# Patient Record
Sex: MALE | Race: WHITE | NOT HISPANIC OR LATINO | Employment: UNEMPLOYED | RURAL
[De-identification: names, ages, dates, MRNs, and addresses within clinical notes are randomized per-mention and may not be internally consistent; named-entity substitution may affect disease eponyms.]

---

## 2020-05-05 ENCOUNTER — HISTORICAL (OUTPATIENT)
Dept: ADMINISTRATIVE | Facility: HOSPITAL | Age: 60
End: 2020-05-05

## 2021-03-23 ENCOUNTER — HOSPITAL ENCOUNTER (EMERGENCY)
Facility: HOSPITAL | Age: 61
Discharge: HOME | End: 2021-03-23
Payer: MEDICAID

## 2021-03-23 VITALS
HEIGHT: 67 IN | HEART RATE: 77 BPM | RESPIRATION RATE: 17 BRPM | WEIGHT: 189.5 LBS | BODY MASS INDEX: 29.74 KG/M2 | SYSTOLIC BLOOD PRESSURE: 155 MMHG | TEMPERATURE: 98 F | DIASTOLIC BLOOD PRESSURE: 85 MMHG | OXYGEN SATURATION: 97 %

## 2021-03-23 DIAGNOSIS — R06.2 WHEEZING: ICD-10-CM

## 2021-03-23 DIAGNOSIS — J40 BRONCHITIS: Primary | ICD-10-CM

## 2021-03-23 PROCEDURE — 99283 EMERGENCY DEPT VISIT LOW MDM: CPT | Mod: 25

## 2021-03-23 PROCEDURE — 25000242 PHARM REV CODE 250 ALT 637 W/ HCPCS: Performed by: NURSE PRACTITIONER

## 2021-03-23 PROCEDURE — 99283 EMERGENCY DEPT VISIT LOW MDM: CPT | Mod: ,,, | Performed by: NURSE PRACTITIONER

## 2021-03-23 PROCEDURE — 96372 THER/PROPH/DIAG INJ SC/IM: CPT

## 2021-03-23 PROCEDURE — 63600175 PHARM REV CODE 636 W HCPCS: Performed by: NURSE PRACTITIONER

## 2021-03-23 PROCEDURE — 99283 PR EMERGENCY DEPT VISIT,LEVEL III: ICD-10-PCS | Mod: ,,, | Performed by: NURSE PRACTITIONER

## 2021-03-23 RX ORDER — IPRATROPIUM BROMIDE AND ALBUTEROL SULFATE 2.5; .5 MG/3ML; MG/3ML
3 SOLUTION RESPIRATORY (INHALATION) ONCE
Status: COMPLETED | OUTPATIENT
Start: 2021-03-23 | End: 2021-03-23

## 2021-03-23 RX ORDER — BENZONATATE 100 MG/1
100 CAPSULE ORAL 3 TIMES DAILY PRN
Qty: 15 CAPSULE | Refills: 0 | Status: SHIPPED | OUTPATIENT
Start: 2021-03-23 | End: 2021-04-02

## 2021-03-23 RX ORDER — METHYLPREDNISOLONE 4 MG/1
TABLET ORAL
Qty: 1 PACKAGE | Refills: 0 | Status: SHIPPED | OUTPATIENT
Start: 2021-03-23 | End: 2021-04-13

## 2021-03-23 RX ORDER — ALBUTEROL SULFATE 90 UG/1
1-2 AEROSOL, METERED RESPIRATORY (INHALATION) EVERY 6 HOURS PRN
Qty: 8 G | Refills: 0 | Status: SHIPPED | OUTPATIENT
Start: 2021-03-23 | End: 2022-11-15

## 2021-03-23 RX ORDER — DEXAMETHASONE SODIUM PHOSPHATE 4 MG/ML
4 INJECTION, SOLUTION INTRA-ARTICULAR; INTRALESIONAL; INTRAMUSCULAR; INTRAVENOUS; SOFT TISSUE
Status: COMPLETED | OUTPATIENT
Start: 2021-03-23 | End: 2021-03-23

## 2021-03-23 RX ADMIN — DEXAMETHASONE SODIUM PHOSPHATE 4 MG: 4 INJECTION, SOLUTION INTRA-ARTICULAR; INTRALESIONAL; INTRAMUSCULAR; INTRAVENOUS; SOFT TISSUE at 05:03

## 2021-03-23 RX ADMIN — IPRATROPIUM BROMIDE AND ALBUTEROL SULFATE 3 ML: .5; 3 SOLUTION RESPIRATORY (INHALATION) at 05:03

## 2021-11-09 ENCOUNTER — HOSPITAL ENCOUNTER (EMERGENCY)
Facility: HOSPITAL | Age: 61
Discharge: HOME | End: 2021-11-09
Payer: MEDICAID

## 2021-11-09 VITALS
OXYGEN SATURATION: 96 % | SYSTOLIC BLOOD PRESSURE: 131 MMHG | RESPIRATION RATE: 18 BRPM | WEIGHT: 190 LBS | BODY MASS INDEX: 29.82 KG/M2 | HEART RATE: 86 BPM | DIASTOLIC BLOOD PRESSURE: 75 MMHG | TEMPERATURE: 98 F | HEIGHT: 67 IN

## 2021-11-09 DIAGNOSIS — J01.10 ACUTE NON-RECURRENT FRONTAL SINUSITIS: ICD-10-CM

## 2021-11-09 DIAGNOSIS — M54.41 ACUTE RIGHT-SIDED LOW BACK PAIN WITH RIGHT-SIDED SCIATICA: Primary | ICD-10-CM

## 2021-11-09 PROCEDURE — 96372 THER/PROPH/DIAG INJ SC/IM: CPT

## 2021-11-09 PROCEDURE — 99283 PR EMERGENCY DEPT VISIT,LEVEL III: ICD-10-PCS | Mod: ,,, | Performed by: NURSE PRACTITIONER

## 2021-11-09 PROCEDURE — 63600175 PHARM REV CODE 636 W HCPCS: Performed by: NURSE PRACTITIONER

## 2021-11-09 PROCEDURE — 99284 EMERGENCY DEPT VISIT MOD MDM: CPT

## 2021-11-09 PROCEDURE — 99283 EMERGENCY DEPT VISIT LOW MDM: CPT | Mod: ,,, | Performed by: NURSE PRACTITIONER

## 2021-11-09 RX ORDER — NAPROXEN SODIUM 220 MG/1
81 TABLET, FILM COATED ORAL DAILY
COMMUNITY
End: 2023-05-22 | Stop reason: SDUPTHER

## 2021-11-09 RX ORDER — FLUTICASONE PROPIONATE 50 UG/1
POWDER, METERED RESPIRATORY (INHALATION)
Status: ON HOLD | COMMUNITY
End: 2023-01-15 | Stop reason: HOSPADM

## 2021-11-09 RX ORDER — LANOLIN ALCOHOL/MO/W.PET/CERES
100 CREAM (GRAM) TOPICAL DAILY
Status: ON HOLD | COMMUNITY
End: 2023-01-15 | Stop reason: HOSPADM

## 2021-11-09 RX ORDER — LORATADINE 10 MG/1
10 TABLET ORAL DAILY
Status: ON HOLD | COMMUNITY
End: 2023-01-15 | Stop reason: SDUPTHER

## 2021-11-09 RX ORDER — AMOXICILLIN AND CLAVULANATE POTASSIUM 875; 125 MG/1; MG/1
1 TABLET, FILM COATED ORAL 2 TIMES DAILY
Qty: 14 TABLET | Refills: 0 | Status: SHIPPED | OUTPATIENT
Start: 2021-11-09 | End: 2022-01-12 | Stop reason: CLARIF

## 2021-11-09 RX ORDER — BUDESONIDE AND FORMOTEROL FUMARATE DIHYDRATE 160; 4.5 UG/1; UG/1
2 AEROSOL RESPIRATORY (INHALATION) EVERY 12 HOURS
Status: ON HOLD | COMMUNITY
End: 2023-01-15 | Stop reason: HOSPADM

## 2021-11-09 RX ORDER — KETOROLAC TROMETHAMINE 30 MG/ML
60 INJECTION, SOLUTION INTRAMUSCULAR; INTRAVENOUS
Status: COMPLETED | OUTPATIENT
Start: 2021-11-09 | End: 2021-11-09

## 2021-11-09 RX ORDER — AMLODIPINE BESYLATE 10 MG/1
10 TABLET ORAL DAILY
COMMUNITY

## 2021-11-09 RX ORDER — METHYLPREDNISOLONE 4 MG/1
TABLET ORAL
Qty: 1 EACH | Refills: 0 | Status: SHIPPED | OUTPATIENT
Start: 2021-11-09 | End: 2021-11-30

## 2021-11-09 RX ORDER — GABAPENTIN 300 MG/1
600 CAPSULE ORAL 3 TIMES DAILY
COMMUNITY
End: 2023-08-29

## 2021-11-09 RX ORDER — ATORVASTATIN CALCIUM 80 MG/1
80 TABLET, FILM COATED ORAL DAILY
Status: ON HOLD | COMMUNITY
End: 2023-01-15 | Stop reason: SDUPTHER

## 2021-11-09 RX ORDER — MELOXICAM 15 MG/1
7.5 TABLET ORAL DAILY
Status: ON HOLD | COMMUNITY
End: 2023-01-15 | Stop reason: SDUPTHER

## 2021-11-09 RX ORDER — METHOCARBAMOL 500 MG/1
500 TABLET, FILM COATED ORAL 2 TIMES DAILY
Status: ON HOLD | COMMUNITY
End: 2023-01-15 | Stop reason: SDUPTHER

## 2021-11-09 RX ORDER — HYDROXYZINE HYDROCHLORIDE 25 MG/1
25 TABLET, FILM COATED ORAL 3 TIMES DAILY
Status: ON HOLD | COMMUNITY
End: 2023-01-15 | Stop reason: SDUPTHER

## 2021-11-09 RX ADMIN — KETOROLAC TROMETHAMINE 60 MG: 30 INJECTION, SOLUTION INTRAMUSCULAR at 08:11

## 2021-11-10 ENCOUNTER — TELEPHONE (OUTPATIENT)
Dept: EMERGENCY MEDICINE | Facility: HOSPITAL | Age: 61
End: 2021-11-10
Payer: MEDICAID

## 2022-01-12 ENCOUNTER — HOSPITAL ENCOUNTER (EMERGENCY)
Facility: HOSPITAL | Age: 62
Discharge: HOME | End: 2022-01-12
Payer: MEDICAID

## 2022-01-12 VITALS
HEIGHT: 67 IN | OXYGEN SATURATION: 95 % | RESPIRATION RATE: 18 BRPM | SYSTOLIC BLOOD PRESSURE: 120 MMHG | HEART RATE: 83 BPM | BODY MASS INDEX: 29.66 KG/M2 | TEMPERATURE: 98 F | WEIGHT: 189 LBS | DIASTOLIC BLOOD PRESSURE: 69 MMHG
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
## 2022-01-12 DIAGNOSIS — S20.212A RIB CONTUSION, LEFT, INITIAL ENCOUNTER: ICD-10-CM
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
## 2022-01-12 PROCEDURE — 99283 EMERGENCY DEPT VISIT LOW MDM: CPT | Mod: ,,, | Performed by: NURSE PRACTITIONER

## 2022-01-12 PROCEDURE — 96372 THER/PROPH/DIAG INJ SC/IM: CPT

## 2022-01-12 PROCEDURE — 63600175 PHARM REV CODE 636 W HCPCS: Performed by: NURSE PRACTITIONER

## 2022-01-12 PROCEDURE — 99284 EMERGENCY DEPT VISIT MOD MDM: CPT

## 2022-01-12 PROCEDURE — 99283 PR EMERGENCY DEPT VISIT,LEVEL III: ICD-10-PCS | Mod: ,,, | Performed by: NURSE PRACTITIONER

## 2022-01-12 RX ORDER — KETOROLAC TROMETHAMINE 30 MG/ML
60 INJECTION, SOLUTION INTRAMUSCULAR; INTRAVENOUS
Status: COMPLETED | OUTPATIENT
Start: 2022-01-12 | End: 2022-01-12

## 2022-01-12 RX ADMIN — KETOROLAC TROMETHAMINE 60 MG: 30 INJECTION, SOLUTION INTRAMUSCULAR at 01:01

## 2022-01-12 NOTE — ED TRIAGE NOTES
Slipped and hit bumper of car.  C/O pain in left ribcage.  States pain is 8 on 0-10 pain scale.  Took 10mgs of hydrocodone for pain and it did not help.  No bruising seen, no deformity.

## 2022-01-12 NOTE — ED PROVIDER NOTES
Encounter Date: 1/12/2022       History     Chief Complaint   Patient presents with    Fall     Fall times two days ago, rates pain at 8 on 0-10 pain scale.  See's YANELY Armas for pain control.  Took hydrocodone 10 mgs with no relief.     60 y/o WM presents to the ED with complaints of left rib pain after having a fall 2 days ago and landing on a bumper.  He reports he takes Hydrocodone 10 mg for chronic pain. Sees pain management. States the pain pill did not give him much relief. Pt is concerned he may have a broken rib - had a broken rib last year from sneezing.         Review of patient's allergies indicates:   Allergen Reactions    Aloe vera latex gloves [gloves, latex with aloe vera] Rash    Latex Hives and Rash     Past Medical History:   Diagnosis Date    Arthritis     Cancer     CHF (congestive heart failure)     Chronic pain     Coronary artery disease     Diabetes mellitus     Hypertension      Past Surgical History:   Procedure Laterality Date    CARDIAC SURGERY      EYE SURGERY      STOMACH SURGERY       History reviewed. No pertinent family history.  Social History     Tobacco Use    Smoking status: Current Every Day Smoker     Packs/day: 1.00    Smokeless tobacco: Never Used   Substance Use Topics    Alcohol use: Never     Review of Systems   Constitutional: Negative.    HENT: Negative.    Eyes: Negative.    Respiratory: Negative.  Negative for chest tightness and shortness of breath.    Cardiovascular: Negative.  Negative for chest pain, palpitations and leg swelling.   Gastrointestinal: Negative.  Negative for diarrhea, nausea and vomiting.   Endocrine: Negative.    Genitourinary: Negative.    Musculoskeletal: Negative.  Negative for arthralgias, back pain and neck pain.        Left rib pain     Skin: Negative.    Neurological: Negative.    Psychiatric/Behavioral: Negative.    All other systems reviewed and are negative.      Physical Exam     Initial Vitals [01/12/22 0114]   BP Pulse  Resp Temp SpO2   (!) 150/74 89 18 97.7 °F (36.5 °C) 96 %      MAP       --         Physical Exam    Nursing note and vitals reviewed.  Constitutional: He appears well-developed and well-nourished.   HENT:   Head: Normocephalic and atraumatic.   Eyes: Conjunctivae and EOM are normal. Pupils are equal, round, and reactive to light.   Neck: Neck supple. No thyromegaly present. No tracheal deviation present. No JVD present.   Normal range of motion.  Cardiovascular: Normal rate, regular rhythm and normal heart sounds.   Pulmonary/Chest: Breath sounds normal. No stridor. No respiratory distress. He has no wheezes. He has no rhonchi. He has no rales. He exhibits tenderness (left rib cage).   Abdominal: Abdomen is soft. Bowel sounds are normal. There is no abdominal tenderness. There is no rebound and no guarding.   Musculoskeletal:         General: Normal range of motion.      Cervical back: Normal range of motion and neck supple.     Lymphadenopathy:     He has no cervical adenopathy.   Neurological: He is alert and oriented to person, place, and time. He has normal strength and normal reflexes.   Skin: Skin is warm and dry.   Psychiatric: He has a normal mood and affect.         Medical Screening Exam   See Full Note    ED Course   Procedures  Labs Reviewed - No data to display       Imaging Results          X-Ray Ribs 2 View Left (In process)                  Medications   ketorolac injection 60 mg (has no administration in time range)                       Clinical Impression:   Final diagnoses:  [W19.XXXA] Fran Dai, [facility]  01/12/22 0231

## 2022-01-14 ENCOUNTER — TELEPHONE (OUTPATIENT)
Dept: EMERGENCY MEDICINE | Facility: HOSPITAL | Age: 62
End: 2022-01-14
Payer: MEDICAID

## 2022-11-13 ENCOUNTER — HOSPITAL ENCOUNTER (EMERGENCY)
Facility: HOSPITAL | Age: 62
Discharge: HOME | End: 2022-11-13
Payer: MEDICAID

## 2022-11-13 VITALS
HEIGHT: 67 IN | SYSTOLIC BLOOD PRESSURE: 140 MMHG | HEART RATE: 89 BPM | BODY MASS INDEX: 29.03 KG/M2 | RESPIRATION RATE: 18 BRPM | TEMPERATURE: 98 F | OXYGEN SATURATION: 96 % | DIASTOLIC BLOOD PRESSURE: 77 MMHG | WEIGHT: 185 LBS

## 2022-11-13 DIAGNOSIS — M79.10 MYALGIA: ICD-10-CM

## 2022-11-13 DIAGNOSIS — J06.9 UPPER RESPIRATORY TRACT INFECTION, UNSPECIFIED TYPE: Primary | ICD-10-CM

## 2022-11-13 PROCEDURE — 96372 THER/PROPH/DIAG INJ SC/IM: CPT

## 2022-11-13 PROCEDURE — 63600175 PHARM REV CODE 636 W HCPCS: Performed by: NURSE PRACTITIONER

## 2022-11-13 PROCEDURE — 99284 EMERGENCY DEPT VISIT MOD MDM: CPT | Mod: ,,, | Performed by: NURSE PRACTITIONER

## 2022-11-13 PROCEDURE — 99284 PR EMERGENCY DEPT VISIT,LEVEL IV: ICD-10-PCS | Mod: ,,, | Performed by: NURSE PRACTITIONER

## 2022-11-13 PROCEDURE — 99284 EMERGENCY DEPT VISIT MOD MDM: CPT

## 2022-11-13 RX ORDER — KETOROLAC TROMETHAMINE 30 MG/ML
30 INJECTION, SOLUTION INTRAMUSCULAR; INTRAVENOUS
Status: COMPLETED | OUTPATIENT
Start: 2022-11-13 | End: 2022-11-13

## 2022-11-13 RX ADMIN — KETOROLAC TROMETHAMINE 30 MG: 30 INJECTION, SOLUTION INTRAMUSCULAR at 03:11

## 2022-11-13 NOTE — ED PROVIDER NOTES
Encounter Date: 11/13/2022       History     Chief Complaint   Patient presents with    URI     Complains of cough, congestion, runny nose and body aches that have been going on since yesterday. Denies prior treatment.     Jonny Singh is a 62 y.o. White /male presenting to ED with cough, congestion, runny nose and body aches for 1 days. Hx of chronic pain and states that the body aches has made his back pain worse. Denies fever or chills. States that his girlfriend was sick with similar sx a few days ago. Currently in NAD. VSS at this time.        The history is provided by the patient.   Review of patient's allergies indicates:   Allergen Reactions    Aloe vera latex gloves [gloves, latex with aloe vera] Rash    Latex Hives and Rash     Past Medical History:   Diagnosis Date    Arthritis     Cancer     CHF (congestive heart failure)     Chronic pain     Coronary artery disease     Diabetes mellitus     Hypertension      Past Surgical History:   Procedure Laterality Date    CARDIAC SURGERY      EYE SURGERY      STOMACH SURGERY       No family history on file.  Social History     Tobacco Use    Smoking status: Every Day     Packs/day: 1.00     Types: Cigarettes    Smokeless tobacco: Never   Substance Use Topics    Alcohol use: Never     Review of Systems   Constitutional:  Negative for activity change, chills, fatigue and fever.   HENT:  Positive for congestion and rhinorrhea. Negative for ear pain, postnasal drip, sinus pressure, sinus pain, sore throat and trouble swallowing.    Eyes:  Negative for redness and itching.   Respiratory:  Positive for cough (non-productive). Negative for shortness of breath and wheezing.    Cardiovascular:  Negative for chest pain.   Gastrointestinal:  Negative for abdominal pain, diarrhea, nausea and vomiting.   Musculoskeletal:  Positive for myalgias. Negative for neck pain and neck stiffness.   Skin:  Negative for rash.   Neurological:  Negative for dizziness, weakness and  headaches.   Psychiatric/Behavioral:  Negative for confusion. The patient is not nervous/anxious.    All other systems reviewed and are negative.    Physical Exam     Initial Vitals [11/13/22 1520]   BP Pulse Resp Temp SpO2   (!) 140/77 89 18 98.2 °F (36.8 °C) 96 %      MAP       --         Physical Exam    Nursing note and vitals reviewed.  Constitutional: He appears well-developed and well-nourished. No distress.   HENT:   Head: Normocephalic.   Right Ear: External ear normal.   Left Ear: External ear normal.   Nose: Mucosal edema and rhinorrhea present. Right sinus exhibits no maxillary sinus tenderness and no frontal sinus tenderness. Left sinus exhibits no maxillary sinus tenderness and no frontal sinus tenderness.   Mouth/Throat: Uvula is midline, oropharynx is clear and moist and mucous membranes are normal. No oropharyngeal exudate, posterior oropharyngeal edema or posterior oropharyngeal erythema.   Eyes: Conjunctivae are normal. Pupils are equal, round, and reactive to light. Right eye exhibits no discharge. Left eye exhibits no discharge.   Neck: Neck supple.   Normal range of motion.  Cardiovascular:  Normal rate, regular rhythm, normal heart sounds and intact distal pulses.           Pulmonary/Chest: Breath sounds normal. No respiratory distress.   Musculoskeletal:         General: Normal range of motion.      Cervical back: Normal range of motion and neck supple.     Lymphadenopathy:     He has no cervical adenopathy.   Neurological: He is alert and oriented to person, place, and time.   Skin: Skin is warm and dry.       Medical Screening Exam   See Full Note    ED Course   Procedures  Labs Reviewed - No data to display       Imaging Results    None          Medications   ketorolac injection 30 mg (30 mg Intramuscular Given 11/13/22 1540)                 ED Course as of 11/13/22 1542   Sun Nov 13, 2022   1539 Offered COVID/flu testing but patient refused. States he will follow up at clinic if sx  persist or worsen. Will give Toradol for body aches and exacerbation of chronic pain. Patient is in agreement with plan to d/c home. V/u of all discharge instructions. No questions or concerns at this time.  [LP]      ED Course User Index  [LP] ARNOLDO Kern          Clinical Impression:   Final diagnoses:  [J06.9] Upper respiratory tract infection, unspecified type (Primary)  [M79.10] Myalgia      ED Disposition Condition    Discharge Stable          ED Prescriptions    None       Follow-up Information    None          ARNOLDO Kern  11/13/22 9894

## 2022-11-13 NOTE — DISCHARGE INSTRUCTIONS
Continue all medications as previously directed.  Ensure plenty of fluid.  Over the counter medications for symptom relief. Only use over the counter medications with a heart on the box.  Follow up with primary care provider in 2-3 days if no improvement or sooner if symptoms worsen.  Return to emergency department for any future emergencies.

## 2022-11-15 ENCOUNTER — OFFICE VISIT (OUTPATIENT)
Dept: FAMILY MEDICINE | Facility: CLINIC | Age: 62
End: 2022-11-15
Payer: MEDICAID

## 2022-11-15 VITALS
DIASTOLIC BLOOD PRESSURE: 68 MMHG | SYSTOLIC BLOOD PRESSURE: 118 MMHG | RESPIRATION RATE: 20 BRPM | HEIGHT: 67 IN | BODY MASS INDEX: 29.19 KG/M2 | WEIGHT: 186 LBS | TEMPERATURE: 98 F | HEART RATE: 90 BPM | OXYGEN SATURATION: 93 %

## 2022-11-15 DIAGNOSIS — R09.81 NASAL CONGESTION: ICD-10-CM

## 2022-11-15 DIAGNOSIS — J06.9 UPPER RESPIRATORY TRACT INFECTION, UNSPECIFIED TYPE: Primary | ICD-10-CM

## 2022-11-15 DIAGNOSIS — R05.9 COUGH, UNSPECIFIED TYPE: ICD-10-CM

## 2022-11-15 DIAGNOSIS — R52 BODY ACHES: ICD-10-CM

## 2022-11-15 LAB
CTP QC/QA: YES
FLUAV AG NPH QL: NEGATIVE
FLUBV AG NPH QL: NEGATIVE
SARS-COV-2 AG RESP QL IA.RAPID: NEGATIVE

## 2022-11-15 PROCEDURE — 1159F PR MEDICATION LIST DOCUMENTED IN MEDICAL RECORD: ICD-10-PCS | Mod: CPTII,,, | Performed by: NURSE PRACTITIONER

## 2022-11-15 PROCEDURE — 3008F PR BODY MASS INDEX (BMI) DOCUMENTED: ICD-10-PCS | Mod: CPTII,,, | Performed by: NURSE PRACTITIONER

## 2022-11-15 PROCEDURE — 99202 PR OFFICE/OUTPT VISIT, NEW, LEVL II, 15-29 MIN: ICD-10-PCS | Mod: ,,, | Performed by: NURSE PRACTITIONER

## 2022-11-15 PROCEDURE — 3074F PR MOST RECENT SYSTOLIC BLOOD PRESSURE < 130 MM HG: ICD-10-PCS | Mod: CPTII,,, | Performed by: NURSE PRACTITIONER

## 2022-11-15 PROCEDURE — 3078F DIAST BP <80 MM HG: CPT | Mod: CPTII,,, | Performed by: NURSE PRACTITIONER

## 2022-11-15 PROCEDURE — 3008F BODY MASS INDEX DOCD: CPT | Mod: CPTII,,, | Performed by: NURSE PRACTITIONER

## 2022-11-15 PROCEDURE — 1159F MED LIST DOCD IN RCRD: CPT | Mod: CPTII,,, | Performed by: NURSE PRACTITIONER

## 2022-11-15 PROCEDURE — 87428 SARSCOV & INF VIR A&B AG IA: CPT | Mod: RHCUB | Performed by: NURSE PRACTITIONER

## 2022-11-15 PROCEDURE — 1160F RVW MEDS BY RX/DR IN RCRD: CPT | Mod: CPTII,,, | Performed by: NURSE PRACTITIONER

## 2022-11-15 PROCEDURE — 1160F PR REVIEW ALL MEDS BY PRESCRIBER/CLIN PHARMACIST DOCUMENTED: ICD-10-PCS | Mod: CPTII,,, | Performed by: NURSE PRACTITIONER

## 2022-11-15 PROCEDURE — 99202 OFFICE O/P NEW SF 15 MIN: CPT | Mod: ,,, | Performed by: NURSE PRACTITIONER

## 2022-11-15 PROCEDURE — 3074F SYST BP LT 130 MM HG: CPT | Mod: CPTII,,, | Performed by: NURSE PRACTITIONER

## 2022-11-15 PROCEDURE — 3078F PR MOST RECENT DIASTOLIC BLOOD PRESSURE < 80 MM HG: ICD-10-PCS | Mod: CPTII,,, | Performed by: NURSE PRACTITIONER

## 2022-11-15 RX ORDER — NITROGLYCERIN 0.4 MG/1
TABLET SUBLINGUAL EVERY 5 MIN PRN
COMMUNITY
Start: 2022-01-13 | End: 2023-08-29

## 2022-11-15 RX ORDER — AZITHROMYCIN 250 MG/1
TABLET, FILM COATED ORAL
Qty: 6 TABLET | Refills: 0 | Status: SHIPPED | OUTPATIENT
Start: 2022-11-15 | End: 2022-11-20

## 2022-11-15 NOTE — PROGRESS NOTES
New clinic note    Jonny Singh is a 62 y.o. male     Chief Complaint:   Chief Complaint   Patient presents with    Cough    Nasal Congestion    Generalized Body Aches    Fever     Low grade fever          Subjective:    Patient complains of cough, bodyaches, fever, and congestion. Symptoms X 4 days. Admits to fever of 101 yesterday. Denies sore throat. Admits to runny nose.     Cough  Associated symptoms include a fever, myalgias, postnasal drip and rhinorrhea. Pertinent negatives include no sore throat or shortness of breath.   Fever   Associated symptoms include congestion and coughing. Pertinent negatives include no sore throat.      Allergies:   Review of patient's allergies indicates:   Allergen Reactions    Aloe vera latex gloves [gloves, latex with aloe vera] Rash    Latex Hives and Rash        Past Medical History:  Past Medical History:   Diagnosis Date    Arthritis     Cancer     CHF (congestive heart failure)     Chronic pain     Coronary artery disease     Diabetes mellitus     Hypertension         Current Medications:    Current Outpatient Medications:     albuterol (PROVENTIL/VENTOLIN HFA) 90 mcg/actuation inhaler, Inhale 1-2 puffs into the lungs every 6 (six) hours as needed for Wheezing. Rescue, Disp: 8 g, Rfl: 0    amLODIPine (NORVASC) 10 MG tablet, Take 10 mg by mouth once daily., Disp: , Rfl:     aspirin 81 MG Chew, Take 81 mg by mouth once daily., Disp: , Rfl:     atorvastatin (LIPITOR) 80 MG tablet, Take 80 mg by mouth once daily., Disp: , Rfl:     cyanocobalamin (VITAMIN B-12) 1000 MCG tablet, Take 100 mcg by mouth once daily., Disp: , Rfl:     fluticasone propionate (FLOVENT DISKUS) 50 mcg/actuation DsDv, Inhale into the lungs. Controller, Disp: , Rfl:     gabapentin (NEURONTIN) 300 MG capsule, Take 300 mg by mouth 3 (three) times daily., Disp: , Rfl:     hydrOXYzine HCL (ATARAX) 25 MG tablet, Take 25 mg by mouth 3 (three) times daily., Disp: , Rfl:     loratadine (CLARITIN) 10 mg  "tablet, Take 10 mg by mouth once daily., Disp: , Rfl:     meloxicam (MOBIC) 15 MG tablet, Take 15 mg by mouth once daily., Disp: , Rfl:     methocarbamoL (ROBAXIN) 500 MG Tab, Take 500 mg by mouth 2 (two) times daily., Disp: , Rfl:     nitroGLYCERIN (NITROSTAT) 0.4 MG SL tablet, 0.4 mg., Disp: , Rfl:     OMEPRAZOLE ORAL, Take by mouth., Disp: , Rfl:     azithromycin (Z-HILL) 250 MG tablet, Take 2 tablets by mouth on day 1; Take 1 tablet by mouth on days 2-5, Disp: 6 tablet, Rfl: 0    budesonide-formoterol 160-4.5 mcg (SYMBICORT) 160-4.5 mcg/actuation HFAA, Inhale 2 puffs into the lungs every 12 (twelve) hours. Controller, Disp: , Rfl:     chlorpheniramine-phenyleph-DM 4-10-10 mg Tab, Take 1 tablet by mouth every 6 (six) hours as needed., Disp: 20 tablet, Rfl: 0    citalopram hydrobromide (CITALOPRAM ORAL), Take by mouth., Disp: , Rfl:        Review of Systems   Constitutional:  Positive for fatigue and fever.   HENT:  Positive for nasal congestion, postnasal drip, rhinorrhea and sinus pressure/congestion. Negative for sore throat.    Respiratory:  Positive for cough. Negative for shortness of breath.    Musculoskeletal:  Positive for myalgias.        Objective:    /68 (BP Location: Left arm, Patient Position: Sitting, BP Method: Large (Manual))   Pulse 90   Temp 97.6 °F (36.4 °C) (Temporal)   Resp 20   Ht 5' 7" (1.702 m)   Wt 84.4 kg (186 lb)   SpO2 (!) 93%   BMI 29.13 kg/m²      Physical Exam  Constitutional:       Appearance: Normal appearance.   HENT:      Right Ear: Tympanic membrane normal.      Left Ear: Tympanic membrane normal.      Nose: Rhinorrhea present. No congestion.      Mouth/Throat:      Pharynx: Oropharyngeal exudate and posterior oropharyngeal erythema present.   Eyes:      Extraocular Movements: Extraocular movements intact.   Cardiovascular:      Rate and Rhythm: Normal rate and regular rhythm.      Pulses: Normal pulses.      Heart sounds: Normal heart sounds.   Pulmonary:      " Effort: Pulmonary effort is normal.      Breath sounds: Normal breath sounds.   Musculoskeletal:      Cervical back: Neck supple.   Lymphadenopathy:      Cervical: No cervical adenopathy.   Neurological:      Mental Status: He is alert and oriented to person, place, and time.        Assessment and Plan:    1. Upper respiratory tract infection, unspecified type    2. Nasal congestion    3. Cough, unspecified type    4. Body aches         Upper respiratory tract infection, unspecified type  -     azithromycin (Z-HILL) 250 MG tablet; Take 2 tablets by mouth on day 1; Take 1 tablet by mouth on days 2-5  Dispense: 6 tablet; Refill: 0  -     chlorpheniramine-phenyleph-DM 4-10-10 mg Tab; Take 1 tablet by mouth every 6 (six) hours as needed.  Dispense: 20 tablet; Refill: 0    Nasal congestion  -     POCT SARS-COV2 (COVID) with Flu Antigen    Cough, unspecified type  -     POCT SARS-COV2 (COVID) with Flu Antigen    Body aches  -     POCT SARS-COV2 (COVID) with Flu Antigen       Covid -  Flu -    There are no Patient Instructions on file for this visit.   Follow up if symptoms worsen or fail to improve.

## 2022-11-16 DIAGNOSIS — J44.9 CHRONIC OBSTRUCTIVE PULMONARY DISEASE, UNSPECIFIED COPD TYPE: Primary | ICD-10-CM

## 2022-11-16 DIAGNOSIS — C34.91 ADENOCARCINOMA OF RIGHT LUNG: ICD-10-CM

## 2022-11-17 ENCOUNTER — HOSPITAL ENCOUNTER (OUTPATIENT)
Dept: RADIOLOGY | Facility: HOSPITAL | Age: 62
Discharge: HOME | End: 2022-11-17
Attending: INTERNAL MEDICINE
Payer: MEDICAID

## 2022-11-17 ENCOUNTER — OFFICE VISIT (OUTPATIENT)
Dept: PULMONOLOGY | Facility: CLINIC | Age: 62
End: 2022-11-17
Payer: MEDICAID

## 2022-11-17 VITALS
OXYGEN SATURATION: 96 % | BODY MASS INDEX: 29.19 KG/M2 | HEIGHT: 67 IN | SYSTOLIC BLOOD PRESSURE: 132 MMHG | RESPIRATION RATE: 29 BRPM | HEART RATE: 88 BPM | DIASTOLIC BLOOD PRESSURE: 68 MMHG | WEIGHT: 186 LBS

## 2022-11-17 DIAGNOSIS — J44.9 CHRONIC OBSTRUCTIVE PULMONARY DISEASE, UNSPECIFIED COPD TYPE: ICD-10-CM

## 2022-11-17 DIAGNOSIS — C34.91 ADENOCARCINOMA OF RIGHT LUNG: ICD-10-CM

## 2022-11-17 DIAGNOSIS — Z72.0 TOBACCO USE: ICD-10-CM

## 2022-11-17 PROCEDURE — 3008F BODY MASS INDEX DOCD: CPT | Mod: CPTII,,, | Performed by: INTERNAL MEDICINE

## 2022-11-17 PROCEDURE — 71046 XR CHEST PA AND LATERAL: ICD-10-PCS | Mod: 26,,, | Performed by: RADIOLOGY

## 2022-11-17 PROCEDURE — 99215 OFFICE O/P EST HI 40 MIN: CPT | Mod: PBBFAC,25 | Performed by: INTERNAL MEDICINE

## 2022-11-17 PROCEDURE — 3075F SYST BP GE 130 - 139MM HG: CPT | Mod: CPTII,,, | Performed by: INTERNAL MEDICINE

## 2022-11-17 PROCEDURE — 71046 X-RAY EXAM CHEST 2 VIEWS: CPT | Mod: TC

## 2022-11-17 PROCEDURE — 71046 X-RAY EXAM CHEST 2 VIEWS: CPT | Mod: 26,,, | Performed by: RADIOLOGY

## 2022-11-17 PROCEDURE — 3078F DIAST BP <80 MM HG: CPT | Mod: CPTII,,, | Performed by: INTERNAL MEDICINE

## 2022-11-17 PROCEDURE — 1159F PR MEDICATION LIST DOCUMENTED IN MEDICAL RECORD: ICD-10-PCS | Mod: CPTII,,, | Performed by: INTERNAL MEDICINE

## 2022-11-17 PROCEDURE — 99204 OFFICE O/P NEW MOD 45 MIN: CPT | Mod: S$PBB,,, | Performed by: INTERNAL MEDICINE

## 2022-11-17 PROCEDURE — 3078F PR MOST RECENT DIASTOLIC BLOOD PRESSURE < 80 MM HG: ICD-10-PCS | Mod: CPTII,,, | Performed by: INTERNAL MEDICINE

## 2022-11-17 PROCEDURE — 99204 PR OFFICE/OUTPT VISIT, NEW, LEVL IV, 45-59 MIN: ICD-10-PCS | Mod: S$PBB,,, | Performed by: INTERNAL MEDICINE

## 2022-11-17 PROCEDURE — 3008F PR BODY MASS INDEX (BMI) DOCUMENTED: ICD-10-PCS | Mod: CPTII,,, | Performed by: INTERNAL MEDICINE

## 2022-11-17 PROCEDURE — 1159F MED LIST DOCD IN RCRD: CPT | Mod: CPTII,,, | Performed by: INTERNAL MEDICINE

## 2022-11-17 PROCEDURE — 3075F PR MOST RECENT SYSTOLIC BLOOD PRESS GE 130-139MM HG: ICD-10-PCS | Mod: CPTII,,, | Performed by: INTERNAL MEDICINE

## 2022-11-17 RX ORDER — TIOTROPIUM BROMIDE INHALATION SPRAY 3.12 UG/1
5 SPRAY, METERED RESPIRATORY (INHALATION) DAILY
Qty: 4 G | Refills: 5 | Status: SHIPPED | OUTPATIENT
Start: 2022-11-17 | End: 2023-05-22

## 2022-11-17 RX ORDER — METOPROLOL TARTRATE 100 MG/1
150 TABLET ORAL 2 TIMES DAILY
COMMUNITY

## 2022-11-17 RX ORDER — FLUTICASONE PROPIONATE AND SALMETEROL 250; 50 UG/1; UG/1
1 POWDER RESPIRATORY (INHALATION) 2 TIMES DAILY
COMMUNITY

## 2022-11-17 RX ORDER — ALBUTEROL SULFATE 0.83 MG/ML
2.5 SOLUTION RESPIRATORY (INHALATION) EVERY 6 HOURS PRN
COMMUNITY

## 2022-11-17 RX ORDER — VIT C/E/ZN/COPPR/LUTEIN/ZEAXAN 250MG-90MG
1000 CAPSULE ORAL DAILY
COMMUNITY
End: 2023-08-29

## 2022-11-17 NOTE — ASSESSMENT & PLAN NOTE
Patient is new here has COPD he is taking Symbicort and Advair we have decreased down 1 of the other were going to add an anticholinergic and will see him back in 6 weeks with baseline PFTs when he comes back will have to discuss CT screening for lung cancer.  Chest x-ray is unremarkable hyperinflation

## 2023-01-14 ENCOUNTER — HOSPITAL ENCOUNTER (OUTPATIENT)
Facility: HOSPITAL | Age: 63
Discharge: HOME | End: 2023-01-15
Attending: FAMILY MEDICINE | Admitting: FAMILY MEDICINE
Payer: MEDICAID

## 2023-01-14 DIAGNOSIS — U07.1 PNEUMONIA DUE TO COVID-19 VIRUS: ICD-10-CM

## 2023-01-14 DIAGNOSIS — R06.2 WHEEZING: ICD-10-CM

## 2023-01-14 DIAGNOSIS — J12.82 PNEUMONIA DUE TO COVID-19 VIRUS: ICD-10-CM

## 2023-01-14 DIAGNOSIS — U07.1 COVID-19: Primary | ICD-10-CM

## 2023-01-14 PROBLEM — J18.9 COMMUNITY ACQUIRED PNEUMONIA: Status: ACTIVE | Noted: 2023-01-14

## 2023-01-14 LAB
ANION GAP SERPL CALCULATED.3IONS-SCNC: 10 MMOL/L (ref 7–16)
BASOPHILS # BLD AUTO: 0.03 K/UL (ref 0–0.2)
BASOPHILS NFR BLD AUTO: 0.5 % (ref 0–1)
BUN SERPL-MCNC: 11 MG/DL (ref 7–18)
BUN/CREAT SERPL: 7 (ref 6–20)
CALCIUM SERPL-MCNC: 9.2 MG/DL (ref 8.5–10.1)
CHLORIDE SERPL-SCNC: 101 MMOL/L (ref 98–107)
CO2 SERPL-SCNC: 31 MMOL/L (ref 21–32)
CREAT SERPL-MCNC: 1.49 MG/DL (ref 0.7–1.3)
DIFFERENTIAL METHOD BLD: ABNORMAL
EGFR (NO RACE VARIABLE) (RUSH/TITUS): 53 ML/MIN/1.73M²
EOSINOPHIL # BLD AUTO: 0.03 K/UL (ref 0–0.5)
EOSINOPHIL NFR BLD AUTO: 0.5 % (ref 1–4)
ERYTHROCYTE [DISTWIDTH] IN BLOOD BY AUTOMATED COUNT: 14.4 % (ref 11.5–14.5)
FLUAV AG UPPER RESP QL IA.RAPID: NEGATIVE
FLUBV AG UPPER RESP QL IA.RAPID: NEGATIVE
GLUCOSE SERPL-MCNC: 108 MG/DL (ref 74–106)
HCT VFR BLD AUTO: 42.3 % (ref 40–54)
HGB BLD-MCNC: 13.3 G/DL (ref 13.5–18)
LYMPHOCYTES # BLD AUTO: 1.04 K/UL (ref 1–4.8)
LYMPHOCYTES NFR BLD AUTO: 18.9 % (ref 27–41)
MCH RBC QN AUTO: 28.9 PG (ref 27–31)
MCHC RBC AUTO-ENTMCNC: 31.4 G/DL (ref 32–36)
MCV RBC AUTO: 91.8 FL (ref 80–96)
MONOCYTES # BLD AUTO: 0.69 K/UL (ref 0–0.8)
MONOCYTES NFR BLD AUTO: 12.5 % (ref 2–6)
MPC BLD CALC-MCNC: 11.2 FL (ref 9.4–12.4)
NEUTROPHILS # BLD AUTO: 3.71 K/UL (ref 1.8–7.7)
NEUTROPHILS NFR BLD AUTO: 67.6 % (ref 53–65)
PLATELET # BLD AUTO: 188 K/UL (ref 150–400)
POTASSIUM SERPL-SCNC: 3.8 MMOL/L (ref 3.5–5.1)
RBC # BLD AUTO: 4.61 M/UL (ref 4.6–6.2)
SARS-COV+SARS-COV-2 AG RESP QL IA.RAPID: POSITIVE
SODIUM SERPL-SCNC: 138 MMOL/L (ref 136–145)
WBC # BLD AUTO: 5.5 K/UL (ref 4.5–11)

## 2023-01-14 PROCEDURE — 87040 BLOOD CULTURE FOR BACTERIA: CPT | Performed by: PHYSICIAN ASSISTANT

## 2023-01-14 PROCEDURE — 96372 THER/PROPH/DIAG INJ SC/IM: CPT | Mod: 59 | Performed by: PHYSICIAN ASSISTANT

## 2023-01-14 PROCEDURE — G0378 HOSPITAL OBSERVATION PER HR: HCPCS

## 2023-01-14 PROCEDURE — 87428 SARSCOV & INF VIR A&B AG IA: CPT | Performed by: PHYSICIAN ASSISTANT

## 2023-01-14 PROCEDURE — 85025 COMPLETE CBC W/AUTO DIFF WBC: CPT | Performed by: PHYSICIAN ASSISTANT

## 2023-01-14 PROCEDURE — 80048 BASIC METABOLIC PNL TOTAL CA: CPT | Performed by: FAMILY MEDICINE

## 2023-01-14 PROCEDURE — 11000001 HC ACUTE MED/SURG PRIVATE ROOM

## 2023-01-14 PROCEDURE — 99284 PR EMERGENCY DEPT VISIT,LEVEL IV: ICD-10-PCS | Mod: ,,, | Performed by: PHYSICIAN ASSISTANT

## 2023-01-14 PROCEDURE — 25000003 PHARM REV CODE 250: Performed by: PHYSICIAN ASSISTANT

## 2023-01-14 PROCEDURE — 63600175 PHARM REV CODE 636 W HCPCS: Performed by: PHYSICIAN ASSISTANT

## 2023-01-14 PROCEDURE — 99900035 HC TECH TIME PER 15 MIN (STAT)

## 2023-01-14 PROCEDURE — 99285 EMERGENCY DEPT VISIT HI MDM: CPT | Mod: 25

## 2023-01-14 PROCEDURE — 96361 HYDRATE IV INFUSION ADD-ON: CPT

## 2023-01-14 PROCEDURE — 96376 TX/PRO/DX INJ SAME DRUG ADON: CPT

## 2023-01-14 PROCEDURE — 99284 EMERGENCY DEPT VISIT MOD MDM: CPT | Mod: ,,, | Performed by: PHYSICIAN ASSISTANT

## 2023-01-14 RX ORDER — AMLODIPINE BESYLATE 10 MG/1
1 TABLET ORAL DAILY
Status: ON HOLD | COMMUNITY
Start: 2023-01-11 | End: 2023-01-15 | Stop reason: HOSPADM

## 2023-01-14 RX ORDER — NALOXONE HCL 0.4 MG/ML
0.02 VIAL (ML) INJECTION
Status: DISCONTINUED | OUTPATIENT
Start: 2023-01-14 | End: 2023-01-15 | Stop reason: HOSPADM

## 2023-01-14 RX ORDER — PANTOPRAZOLE SODIUM 40 MG/1
40 TABLET, DELAYED RELEASE ORAL DAILY
Status: DISCONTINUED | OUTPATIENT
Start: 2023-01-15 | End: 2023-01-15 | Stop reason: HOSPADM

## 2023-01-14 RX ORDER — ENOXAPARIN SODIUM 100 MG/ML
40 INJECTION SUBCUTANEOUS EVERY 24 HOURS
Status: DISCONTINUED | OUTPATIENT
Start: 2023-01-15 | End: 2023-01-15 | Stop reason: HOSPADM

## 2023-01-14 RX ORDER — KETOROLAC TROMETHAMINE 30 MG/ML
30 INJECTION, SOLUTION INTRAMUSCULAR; INTRAVENOUS
Status: COMPLETED | OUTPATIENT
Start: 2023-01-14 | End: 2023-01-14

## 2023-01-14 RX ORDER — HYDROCODONE BITARTRATE AND ACETAMINOPHEN 10; 325 MG/1; MG/1
1 TABLET ORAL 2 TIMES DAILY
Status: DISCONTINUED | OUTPATIENT
Start: 2023-01-14 | End: 2023-01-15

## 2023-01-14 RX ORDER — SODIUM CHLORIDE 0.9 % (FLUSH) 0.9 %
10 SYRINGE (ML) INJECTION EVERY 12 HOURS PRN
Status: DISCONTINUED | OUTPATIENT
Start: 2023-01-14 | End: 2023-01-15 | Stop reason: HOSPADM

## 2023-01-14 RX ORDER — GABAPENTIN 300 MG/1
600 CAPSULE ORAL 3 TIMES DAILY
Status: DISCONTINUED | OUTPATIENT
Start: 2023-01-15 | End: 2023-01-15 | Stop reason: HOSPADM

## 2023-01-14 RX ORDER — HYDROXYZINE HYDROCHLORIDE 25 MG/1
25 TABLET, FILM COATED ORAL 3 TIMES DAILY
Status: DISCONTINUED | OUTPATIENT
Start: 2023-01-15 | End: 2023-01-15

## 2023-01-14 RX ORDER — FLUTICASONE PROPIONATE 50 UG/1
50 POWDER, METERED RESPIRATORY (INHALATION) DAILY
Status: DISCONTINUED | OUTPATIENT
Start: 2023-01-15 | End: 2023-01-14

## 2023-01-14 RX ORDER — NAPROXEN SODIUM 220 MG/1
81 TABLET, FILM COATED ORAL DAILY
Status: DISCONTINUED | OUTPATIENT
Start: 2023-01-15 | End: 2023-01-15 | Stop reason: HOSPADM

## 2023-01-14 RX ORDER — METHOCARBAMOL 500 MG/1
500 TABLET, FILM COATED ORAL 2 TIMES DAILY
Status: DISCONTINUED | OUTPATIENT
Start: 2023-01-14 | End: 2023-01-15

## 2023-01-14 RX ORDER — ALBUTEROL SULFATE 90 UG/1
2 AEROSOL, METERED RESPIRATORY (INHALATION) EVERY 8 HOURS
Status: DISCONTINUED | OUTPATIENT
Start: 2023-01-15 | End: 2023-01-14

## 2023-01-14 RX ORDER — CITALOPRAM 10 MG/1
10 TABLET ORAL DAILY
Status: DISCONTINUED | OUTPATIENT
Start: 2023-01-15 | End: 2023-01-15 | Stop reason: HOSPADM

## 2023-01-14 RX ORDER — HYDROCODONE BITARTRATE AND ACETAMINOPHEN 10; 325 MG/1; MG/1
TABLET ORAL
COMMUNITY
Start: 2022-12-24 | End: 2023-08-29

## 2023-01-14 RX ORDER — AMLODIPINE BESYLATE 5 MG/1
10 TABLET ORAL DAILY
Status: DISCONTINUED | OUTPATIENT
Start: 2023-01-15 | End: 2023-01-15 | Stop reason: HOSPADM

## 2023-01-14 RX ORDER — BUDESONIDE AND FORMOTEROL FUMARATE DIHYDRATE 160; 4.5 UG/1; UG/1
2 AEROSOL RESPIRATORY (INHALATION) EVERY 12 HOURS
Status: DISCONTINUED | OUTPATIENT
Start: 2023-01-14 | End: 2023-01-14

## 2023-01-14 RX ORDER — ENOXAPARIN SODIUM 100 MG/ML
40 INJECTION SUBCUTANEOUS
Status: COMPLETED | OUTPATIENT
Start: 2023-01-14 | End: 2023-01-14

## 2023-01-14 RX ORDER — INSULIN ASPART 100 [IU]/ML
0-5 INJECTION, SOLUTION INTRAVENOUS; SUBCUTANEOUS
Status: DISCONTINUED | OUTPATIENT
Start: 2023-01-14 | End: 2023-01-15

## 2023-01-14 RX ORDER — UBIDECARENONE 75 MG
1000 CAPSULE ORAL DAILY
Status: DISCONTINUED | OUTPATIENT
Start: 2023-01-15 | End: 2023-01-15 | Stop reason: HOSPADM

## 2023-01-14 RX ORDER — ALBUTEROL SULFATE 0.83 MG/ML
2.5 SOLUTION RESPIRATORY (INHALATION) EVERY 6 HOURS PRN
Status: DISCONTINUED | OUTPATIENT
Start: 2023-01-14 | End: 2023-01-14

## 2023-01-14 RX ORDER — FLUTICASONE PROPIONATE AND SALMETEROL 250; 50 UG/1; UG/1
1 POWDER RESPIRATORY (INHALATION) 2 TIMES DAILY
Status: DISCONTINUED | OUTPATIENT
Start: 2023-01-14 | End: 2023-01-15 | Stop reason: HOSPADM

## 2023-01-14 RX ORDER — ALBUTEROL SULFATE 90 UG/1
2 AEROSOL, METERED RESPIRATORY (INHALATION) EVERY 6 HOURS
Status: DISCONTINUED | OUTPATIENT
Start: 2023-01-15 | End: 2023-01-15 | Stop reason: HOSPADM

## 2023-01-14 RX ORDER — CHOLECALCIFEROL (VITAMIN D3) 25 MCG
1000 TABLET ORAL DAILY
Status: DISCONTINUED | OUTPATIENT
Start: 2023-01-15 | End: 2023-01-15 | Stop reason: HOSPADM

## 2023-01-14 RX ORDER — UBIDECARENONE 75 MG
1000 CAPSULE ORAL DAILY
Status: DISCONTINUED | OUTPATIENT
Start: 2023-01-15 | End: 2023-01-14

## 2023-01-14 RX ORDER — METHYLPREDNISOLONE SOD SUCC 125 MG
125 VIAL (EA) INJECTION
Status: COMPLETED | OUTPATIENT
Start: 2023-01-14 | End: 2023-01-14

## 2023-01-14 RX ORDER — IBUPROFEN 200 MG
16 TABLET ORAL
Status: DISCONTINUED | OUTPATIENT
Start: 2023-01-14 | End: 2023-01-15

## 2023-01-14 RX ORDER — LANOLIN ALCOHOL/MO/W.PET/CERES
1000 CREAM (GRAM) TOPICAL
COMMUNITY
Start: 2023-01-11

## 2023-01-14 RX ORDER — ATORVASTATIN CALCIUM 40 MG/1
80 TABLET, FILM COATED ORAL DAILY
Status: DISCONTINUED | OUTPATIENT
Start: 2023-01-15 | End: 2023-01-15 | Stop reason: HOSPADM

## 2023-01-14 RX ORDER — ONDANSETRON 2 MG/ML
4 INJECTION INTRAMUSCULAR; INTRAVENOUS EVERY 8 HOURS PRN
Status: DISCONTINUED | OUTPATIENT
Start: 2023-01-14 | End: 2023-01-15 | Stop reason: HOSPADM

## 2023-01-14 RX ORDER — GLUCAGON 1 MG
1 KIT INJECTION
Status: DISCONTINUED | OUTPATIENT
Start: 2023-01-14 | End: 2023-01-15

## 2023-01-14 RX ORDER — MELOXICAM 7.5 MG/1
7.5 TABLET ORAL DAILY
Status: DISCONTINUED | OUTPATIENT
Start: 2023-01-15 | End: 2023-01-14

## 2023-01-14 RX ORDER — IBUPROFEN 200 MG
24 TABLET ORAL
Status: DISCONTINUED | OUTPATIENT
Start: 2023-01-14 | End: 2023-01-15

## 2023-01-14 RX ORDER — IPRATROPIUM BROMIDE AND ALBUTEROL SULFATE 2.5; .5 MG/3ML; MG/3ML
3 SOLUTION RESPIRATORY (INHALATION)
Status: DISCONTINUED | OUTPATIENT
Start: 2023-01-14 | End: 2023-01-14

## 2023-01-14 RX ORDER — NITROGLYCERIN 0.4 MG/1
0.4 TABLET SUBLINGUAL EVERY 5 MIN PRN
Status: DISCONTINUED | OUTPATIENT
Start: 2023-01-14 | End: 2023-01-15 | Stop reason: HOSPADM

## 2023-01-14 RX ORDER — AMLODIPINE BESYLATE 5 MG/1
10 TABLET ORAL DAILY
Status: DISCONTINUED | OUTPATIENT
Start: 2023-01-15 | End: 2023-01-14

## 2023-01-14 RX ORDER — CETIRIZINE HYDROCHLORIDE 10 MG/1
10 TABLET ORAL DAILY
Status: DISCONTINUED | OUTPATIENT
Start: 2023-01-15 | End: 2023-01-15

## 2023-01-14 RX ADMIN — KETOROLAC TROMETHAMINE 30 MG: 30 INJECTION, SOLUTION INTRAMUSCULAR; INTRAVENOUS at 06:01

## 2023-01-14 RX ADMIN — METHOCARBAMOL 500 MG: 500 TABLET, FILM COATED ORAL at 11:01

## 2023-01-14 RX ADMIN — SODIUM CHLORIDE 500 ML: 9 INJECTION, SOLUTION INTRAVENOUS at 08:01

## 2023-01-14 RX ADMIN — CEFTRIAXONE SODIUM 1 G: 1 INJECTION, POWDER, FOR SOLUTION INTRAMUSCULAR; INTRAVENOUS at 08:01

## 2023-01-14 RX ADMIN — ENOXAPARIN SODIUM 40 MG: 40 INJECTION SUBCUTANEOUS at 08:01

## 2023-01-14 RX ADMIN — HYDROCODONE BITARTRATE AND ACETAMINOPHEN 1 TABLET: 10; 325 TABLET ORAL at 11:01

## 2023-01-14 RX ADMIN — METOPROLOL TARTRATE 150 MG: 100 TABLET, FILM COATED ORAL at 11:01

## 2023-01-14 RX ADMIN — METHYLPREDNISOLONE SODIUM SUCCINATE 125 MG: 125 INJECTION, POWDER, FOR SOLUTION INTRAMUSCULAR; INTRAVENOUS at 06:01

## 2023-01-14 NOTE — Clinical Note
Diagnosis: Wheezing [786.07.ICD-9-CM]   Admitting Provider:: LAMAR SANTAMARIA [[id number]]   Future Attending Provider: LAMAR SANTAMARIA [[id number]]   Reason for IP Medical Treatment  (Clinical interventions that can only be accomplished in the IP setting? ) :: COVID/pneumonia   Estimated Length of Stay:: 2 midnights   I certify that Inpatient services for greater than or equal to 2 midnights are medically necessary:: Yes   Plans for Post-Acute care--if anticipated (pick the single best option):: A. No post acute care anticipated at this time

## 2023-01-15 VITALS
DIASTOLIC BLOOD PRESSURE: 69 MMHG | TEMPERATURE: 98 F | BODY MASS INDEX: 29.15 KG/M2 | SYSTOLIC BLOOD PRESSURE: 123 MMHG | RESPIRATION RATE: 18 BRPM | HEART RATE: 73 BPM | OXYGEN SATURATION: 94 % | WEIGHT: 185.69 LBS | HEIGHT: 67 IN

## 2023-01-15 PROCEDURE — G0378 HOSPITAL OBSERVATION PER HR: HCPCS

## 2023-01-15 PROCEDURE — 25000003 PHARM REV CODE 250: Performed by: PHYSICIAN ASSISTANT

## 2023-01-15 PROCEDURE — 63600175 PHARM REV CODE 636 W HCPCS: Performed by: PHYSICIAN ASSISTANT

## 2023-01-15 PROCEDURE — 27100098 HC SPACER

## 2023-01-15 PROCEDURE — 25000003 PHARM REV CODE 250: Performed by: NURSE PRACTITIONER

## 2023-01-15 PROCEDURE — 63700000 PHARM REV CODE 250 ALT 637 W/O HCPCS: Performed by: NURSE PRACTITIONER

## 2023-01-15 PROCEDURE — 25000242 PHARM REV CODE 250 ALT 637 W/ HCPCS: Performed by: PHYSICIAN ASSISTANT

## 2023-01-15 PROCEDURE — 94640 AIRWAY INHALATION TREATMENT: CPT | Mod: XB

## 2023-01-15 PROCEDURE — 96361 HYDRATE IV INFUSION ADD-ON: CPT

## 2023-01-15 PROCEDURE — 94761 N-INVAS EAR/PLS OXIMETRY MLT: CPT

## 2023-01-15 PROCEDURE — 63600175 PHARM REV CODE 636 W HCPCS: Performed by: NURSE PRACTITIONER

## 2023-01-15 PROCEDURE — 96365 THER/PROPH/DIAG IV INF INIT: CPT | Mod: 59

## 2023-01-15 PROCEDURE — 94640 AIRWAY INHALATION TREATMENT: CPT

## 2023-01-15 PROCEDURE — 25500020 PHARM REV CODE 255: Performed by: FAMILY MEDICINE

## 2023-01-15 PROCEDURE — 27000939

## 2023-01-15 RX ORDER — CEFDINIR 300 MG/1
300 CAPSULE ORAL 2 TIMES DAILY
Qty: 14 CAPSULE | Refills: 0 | Status: SHIPPED | OUTPATIENT
Start: 2023-01-15 | End: 2023-01-22

## 2023-01-15 RX ORDER — HYDROCODONE BITARTRATE AND ACETAMINOPHEN 10; 325 MG/1; MG/1
1 TABLET ORAL 2 TIMES DAILY PRN
Status: DISCONTINUED | OUTPATIENT
Start: 2023-01-15 | End: 2023-01-15 | Stop reason: HOSPADM

## 2023-01-15 RX ORDER — MELOXICAM 15 MG/1
7.5 TABLET ORAL DAILY PRN
Qty: 30 TABLET | Refills: 0
Start: 2023-01-15

## 2023-01-15 RX ORDER — LORATADINE 10 MG/1
10 TABLET ORAL NIGHTLY PRN
Qty: 30 TABLET | Refills: 0
Start: 2023-01-15

## 2023-01-15 RX ORDER — HYDROXYZINE HYDROCHLORIDE 25 MG/1
25 TABLET, FILM COATED ORAL 3 TIMES DAILY PRN
Status: DISCONTINUED | OUTPATIENT
Start: 2023-01-15 | End: 2023-01-15 | Stop reason: HOSPADM

## 2023-01-15 RX ORDER — CETIRIZINE HYDROCHLORIDE 10 MG/1
10 TABLET ORAL NIGHTLY
Status: DISCONTINUED | OUTPATIENT
Start: 2023-01-15 | End: 2023-01-15 | Stop reason: HOSPADM

## 2023-01-15 RX ORDER — ATORVASTATIN CALCIUM 80 MG/1
80 TABLET, FILM COATED ORAL NIGHTLY
Qty: 30 TABLET | Refills: 0
Start: 2023-01-15 | End: 2023-05-22 | Stop reason: SDUPTHER

## 2023-01-15 RX ORDER — METHOCARBAMOL 500 MG/1
500 TABLET, FILM COATED ORAL 2 TIMES DAILY PRN
Status: DISCONTINUED | OUTPATIENT
Start: 2023-01-15 | End: 2023-01-15 | Stop reason: HOSPADM

## 2023-01-15 RX ORDER — AZITHROMYCIN 250 MG/1
250 TABLET, FILM COATED ORAL DAILY
Status: DISCONTINUED | OUTPATIENT
Start: 2023-01-16 | End: 2023-01-15 | Stop reason: HOSPADM

## 2023-01-15 RX ORDER — CITALOPRAM 10 MG/1
10 TABLET ORAL DAILY
Qty: 30 TABLET | Refills: 0
Start: 2023-01-15 | End: 2023-05-08

## 2023-01-15 RX ORDER — HYDROXYZINE HYDROCHLORIDE 25 MG/1
25 TABLET, FILM COATED ORAL 3 TIMES DAILY PRN
Qty: 30 TABLET | Refills: 0
Start: 2023-01-15 | End: 2023-08-29

## 2023-01-15 RX ORDER — AZITHROMYCIN 250 MG/1
250 TABLET, FILM COATED ORAL DAILY
Qty: 4 TABLET | Refills: 0 | Status: SHIPPED | OUTPATIENT
Start: 2023-01-15 | End: 2023-05-08

## 2023-01-15 RX ORDER — AZITHROMYCIN 250 MG/1
500 TABLET, FILM COATED ORAL ONCE
Status: COMPLETED | OUTPATIENT
Start: 2023-01-15 | End: 2023-01-15

## 2023-01-15 RX ORDER — METHOCARBAMOL 500 MG/1
500 TABLET, FILM COATED ORAL 2 TIMES DAILY PRN
Qty: 30 TABLET | Refills: 0
Start: 2023-01-15

## 2023-01-15 RX ADMIN — ALBUTEROL SULFATE 2 PUFF: 90 AEROSOL, METERED RESPIRATORY (INHALATION) at 12:01

## 2023-01-15 RX ADMIN — Medication 1000 UNITS: at 09:01

## 2023-01-15 RX ADMIN — IOPAMIDOL 100 ML: 755 INJECTION, SOLUTION INTRAVENOUS at 08:01

## 2023-01-15 RX ADMIN — PREDNISONE 50 MG: 20 TABLET ORAL at 09:01

## 2023-01-15 RX ADMIN — ATORVASTATIN CALCIUM 80 MG: 40 TABLET, FILM COATED ORAL at 09:01

## 2023-01-15 RX ADMIN — CEFTRIAXONE 1 G: 1 INJECTION, POWDER, FOR SOLUTION INTRAMUSCULAR; INTRAVENOUS at 11:01

## 2023-01-15 RX ADMIN — METOPROLOL TARTRATE 150 MG: 100 TABLET, FILM COATED ORAL at 09:01

## 2023-01-15 RX ADMIN — AMLODIPINE BESYLATE 10 MG: 5 TABLET ORAL at 09:01

## 2023-01-15 RX ADMIN — CYANOCOBALAMIN TAB 500 MCG 1000 MCG: 500 TAB at 09:01

## 2023-01-15 RX ADMIN — CITALOPRAM HYDROBROMIDE 10 MG: 10 TABLET ORAL at 09:01

## 2023-01-15 RX ADMIN — ALBUTEROL SULFATE 2 PUFF: 90 AEROSOL, METERED RESPIRATORY (INHALATION) at 07:01

## 2023-01-15 RX ADMIN — ASPIRIN 81 MG 81 MG: 81 TABLET ORAL at 09:01

## 2023-01-15 RX ADMIN — GABAPENTIN 600 MG: 300 CAPSULE ORAL at 09:01

## 2023-01-15 RX ADMIN — PANTOPRAZOLE SODIUM 40 MG: 40 TABLET, DELAYED RELEASE ORAL at 09:01

## 2023-01-15 RX ADMIN — AZITHROMYCIN MONOHYDRATE 500 MG: 250 TABLET ORAL at 09:01

## 2023-01-15 RX ADMIN — SODIUM CHLORIDE 1000 ML: 9 INJECTION, SOLUTION INTRAVENOUS at 09:01

## 2023-01-15 NOTE — PLAN OF CARE
Problem: Adult Inpatient Plan of Care  Goal: Plan of Care Review  1/14/2023 2308 by Randolph Brown RN  Outcome: Ongoing, Progressing  1/14/2023 2307 by Randolph Brown RN  Outcome: Ongoing, Progressing  Goal: Patient-Specific Goal (Individualized)  1/14/2023 2308 by Randolph Brown RN  Outcome: Ongoing, Progressing  1/14/2023 2307 by Randolph Brown RN  Outcome: Ongoing, Progressing  Goal: Absence of Hospital-Acquired Illness or Injury  1/14/2023 2308 by Randolph Brown RN  Outcome: Ongoing, Progressing  1/14/2023 2307 by Randolph Brown RN  Outcome: Ongoing, Progressing  Goal: Optimal Comfort and Wellbeing  1/14/2023 2308 by Randolph Brown RN  Outcome: Ongoing, Progressing  1/14/2023 2307 by Randolph Brown RN  Outcome: Ongoing, Progressing  Goal: Readiness for Transition of Care  1/14/2023 2308 by Randolph Brown RN  Outcome: Ongoing, Progressing  1/14/2023 2307 by Randolph Brown RN  Outcome: Ongoing, Progressing     Problem: Infection  Goal: Absence of Infection Signs and Symptoms  Outcome: Ongoing, Progressing

## 2023-01-15 NOTE — ASSESSMENT & PLAN NOTE
Patient is identified as High risk for severe complications of COVID 19 based on COVID risk score of 3   Initiate standard COVID protocols; COVID-19 testing ,Infection Control notification  and isolation- respiratory, contact and droplet per protocol    Diagnostics: (leukopenia, hyponatremia, hyperferritinemia, elevated troponin, elevated d-dimer, age, and comorbidities are significant predictors of poor clinical outcome)    Management:     Patient will get albuterol MDI q.6 hours.    He was placed on Lovenox 40 mg subcutaneous daily for anticoagulation.

## 2023-01-15 NOTE — PLAN OF CARE
Problem: Adult Inpatient Plan of Care  Goal: Plan of Care Review  Outcome: Met  Goal: Patient-Specific Goal (Individualized)  Outcome: Met  Goal: Absence of Hospital-Acquired Illness or Injury  Outcome: Met  Goal: Optimal Comfort and Wellbeing  Outcome: Met  Goal: Readiness for Transition of Care  Outcome: Met     Problem: Infection  Goal: Absence of Infection Signs and Symptoms  Outcome: Met     Problem: Fluid Imbalance (Pneumonia)  Goal: Fluid Balance  Outcome: Met     Problem: Infection (Pneumonia)  Goal: Resolution of Infection Signs and Symptoms  Outcome: Met     Problem: Respiratory Compromise (Pneumonia)  Goal: Effective Oxygenation and Ventilation  Outcome: Met

## 2023-01-15 NOTE — HOSPITAL COURSE
01/15/2023:  Patient admitted to observation with COVID.  Symptom onset 6 days ago.  Reports congestion, cough, and myalgia.  Reports symptoms are improved as compared to the past few days.  Denies dyspnea.  No change in sputum characteristic.  Longstanding history of smoking.  Has a faint end expiratory wheeze.  Patient reports he wheezes chronically.  Is on a short-acting and long-acting beta agonist as well as inhaled steroid.  Respirations even and nonlabored.  Room air sat 96%.  Chest x-ray from yesterday demonstrated a right lower lobe consolidation concerning for neoplasm versus pneumonia. CT demonstrates linear infiltrates. Advise outpatient follow for repeat imaging for resolution. Patient reports history of right lower lobe neoplasm treatment with radiation several years ago.  Is followed by oncology at [facility].  Not currently undergoing treatment. Will discharge the patient home today.  He is ambulatory and can perform ADLs independently. Does not have exertional dyspnea. Lives with his girlfriend. Discussed with Dr Aguilar.

## 2023-01-15 NOTE — SUBJECTIVE & OBJECTIVE
Past Medical History:   Diagnosis Date    Arthritis     Cancer     AdenoCarcinoma RLL : XRT  ~ [id number] Felix    CHF (congestive heart failure)     Chronic pain     Coronary artery disease     Diabetes mellitus     Hypertension        Past Surgical History:   Procedure Laterality Date    CARDIAC SURGERY      EYE SURGERY      NECK SURGERY N/A     ~ 2016 [place]    STOMACH SURGERY         Review of patient's allergies indicates:   Allergen Reactions    Aloe vera latex gloves [gloves, latex with aloe vera] Rash    Latex Hives and Rash       No current facility-administered medications on file prior to encounter.     Current Outpatient Medications on File Prior to Encounter   Medication Sig    albuterol (PROVENTIL) 2.5 mg /3 mL (0.083 %) nebulizer solution Take 2.5 mg by nebulization every 6 (six) hours as needed for Wheezing or Shortness of Breath. Using ~ 1 time/day recently:  does not uuse when doing well Rescue    amLODIPine (NORVASC) 10 MG tablet Take 10 mg by mouth once daily.    amLODIPine (NORVASC) 10 MG tablet Take 1 tablet by mouth once daily.    aspirin 81 MG Chew Take 81 mg by mouth once daily.    atorvastatin (LIPITOR) 80 MG tablet Take 80 mg by mouth once daily.    cholecalciferol, vitamin D3, (VITAMIN D3) 25 mcg (1,000 unit) capsule Take 1,000 Units by mouth once daily.    cyanocobalamin (VITAMIN B-12) 1000 MCG tablet Take 100 mcg by mouth once daily.    cyanocobalamin (VITAMIN B-12) 1000 MCG tablet 1,000 mcg.    fluticasone-salmeterol diskus inhaler 250-50 mcg Inhale 1 puff into the lungs 2 (two) times daily. Controller    gabapentin (NEURONTIN) 300 MG capsule Take 600 mg by mouth 3 (three) times daily.    loratadine (CLARITIN) 10 mg tablet Take 10 mg by mouth once daily.    meloxicam (MOBIC) 15 MG tablet Take 7.5 mg by mouth once daily.    methocarbamoL (ROBAXIN) 500 MG Tab Take 500 mg by mouth 2 (two) times daily.    metoprolol tartrate (LOPRESSOR) 100 MG tablet Take 150 mg by mouth 2 (two) times  daily. 100 mg tab: 1.5 tab BID    nitroGLYCERIN (NITROSTAT) 0.4 MG SL tablet every 5 (five) minutes as needed. Prn, not recently used    OMEPRAZOLE ORAL Take 20 mg by mouth once daily.    budesonide-formoterol 160-4.5 mcg (SYMBICORT) 160-4.5 mcg/actuation HFAA Inhale 2 puffs into the lungs every 12 (twelve) hours. Controller    chlorpheniramine-phenyleph-DM 4-10-10 mg Tab Take 1 tablet by mouth every 6 (six) hours as needed.    citalopram hydrobromide (CITALOPRAM ORAL) Take by mouth.    fluticasone propionate (FLOVENT DISKUS) 50 mcg/actuation DsDv Inhale into the lungs. Controller    HYDROcodone-acetaminophen (NORCO)  mg per tablet TAKE 1 TABLET TWICE A DAY AS NEEDED FOR 30 DAYS    hydrOXYzine HCL (ATARAX) 25 MG tablet Take 25 mg by mouth 3 (three) times daily.    tiotropium bromide (SPIRIVA RESPIMAT) 2.5 mcg/actuation inhaler Inhale 2 puffs into the lungs Daily. Controller     Family History       Problem Relation (Age of Onset)    Cancer Father    Heart disease Mother, Father          Tobacco Use    Smoking status: Every Day     Packs/day: 1.00     Types: Cigarettes    Smokeless tobacco: Never   Substance and Sexual Activity    Alcohol use: Never    Drug use: Not on file    Sexual activity: Not Currently     Review of Systems   Constitutional:  Negative for fever.   HENT:  Positive for congestion. Negative for trouble swallowing.    Eyes:  Negative for visual disturbance.   Respiratory:  Positive for cough and wheezing. Negative for shortness of breath.    Cardiovascular:  Negative for chest pain, palpitations and leg swelling.   Gastrointestinal:  Negative for abdominal pain, diarrhea, nausea and vomiting.   Genitourinary:  Negative for decreased urine volume, dysuria, frequency and hematuria.   Skin:  Negative for color change.   Neurological:  Negative for dizziness, numbness and headaches.   Objective:     Vital Signs (Most Recent):  Temp: 98.6 °F (37 °C) (01/14/23 2100)  Pulse: 76 (01/14/23 2100)  Resp:  20 (01/14/23 2329)  BP: 127/71 (01/14/23 2100)  SpO2: 96 % (01/14/23 2100) Vital Signs (24h Range):  Temp:  [98.5 °F (36.9 °C)-98.6 °F (37 °C)] 98.6 °F (37 °C)  Pulse:  [76-81] 76  Resp:  [20] 20  SpO2:  [96 %] 96 %  BP: (127-134)/(68-71) 127/71     Weight: 84.4 kg (186 lb)  Body mass index is 29.13 kg/m².    Physical Exam  Vitals reviewed.   Constitutional:       General: He is not in acute distress.  HENT:      Head: Normocephalic and atraumatic.      Mouth/Throat:      Mouth: Mucous membranes are moist.   Eyes:      Extraocular Movements: Extraocular movements intact.   Cardiovascular:      Rate and Rhythm: Normal rate and regular rhythm.      Heart sounds: Normal heart sounds.   Pulmonary:      Effort: Pulmonary effort is normal. No respiratory distress.      Breath sounds: Wheezing (end-expiratory wheeze) present.   Abdominal:      General: There is no distension.      Palpations: Abdomen is soft.      Tenderness: There is no abdominal tenderness.   Musculoskeletal:      Cervical back: Neck supple.      Right lower leg: No edema.      Left lower leg: No edema.   Skin:     General: Skin is warm and dry.      Capillary Refill: Capillary refill takes less than 2 seconds.   Neurological:      Mental Status: He is alert and oriented to person, place, and time. Mental status is at baseline.           Significant Labs: All pertinent labs within the past 24 hours have been reviewed.    Significant Imaging: I have reviewed all pertinent imaging results/findings within the past 24 hours.

## 2023-01-15 NOTE — NURSING
8207 Spoke with pt regarding home medications. Pt stated he only takes his Norco 10mg at home as he needs them. Pt declined a need for them at this time. Pt also stated he was a borderline diabetic and refused an accu check at this time.     5622 Spoke with Aric Jiménez NP regarding pts home medications and the scheduled twice daily Norco 10mg here in the hospital. MEGHANN Jiménez NP stated he will change administration. Will continue to follow plan of care.

## 2023-01-15 NOTE — NURSING
1145 IV removed intact. Tolerated removal well. Pressure held at site and secured with cotton ball and tape. Discharge packet reviewed with patient. Pt verbalized understanding. Belongings collected in belonging bag by this nurse and pt including cell phone, cane, jacket, pants and shirt, and shoes. Pt wheeled to Er parking lot where his vehicle was.

## 2023-01-15 NOTE — H&P
[facility]  Hospital Medicine  History & Physical    Patient Name: Jonny Singh  MRN: [id number]  Patient Class: IP- Inpatient  Admission Date: 1/14/2023  Attending Physician: Jame Alas  Primary Care Provider: Primary Doctor No         Patient information was obtained from patient and ER records.     Subjective:     Principal Problem:COVID-19    Chief Complaint:   Chief Complaint   Patient presents with    Nasal Congestion     Cold symptoms with aching         HPI: Patient is a 62-year-old male with history of cough, congestion, body aches, and fever.  He was evaluated in the emergency department, diagnosed with COVID, and pneumonia.    I discussed patient with Dr. Alas, he instructed to admit patient to observation for IV antibiotics, breathing treatment with MDI, and steroids.    At the time of admittance patient stated he was feeling much better, breathing easier, and his body aches had resolved.          Past Medical History:   Diagnosis Date    Arthritis     Cancer     AdenoCarcinoma RLL : XRT  ~ 6954-1350 Felix    CHF (congestive heart failure)     Chronic pain     Coronary artery disease     Diabetes mellitus     Hypertension        Past Surgical History:   Procedure Laterality Date    CARDIAC SURGERY      EYE SURGERY      NECK SURGERY N/A     ~ 2016 [place]    STOMACH SURGERY         Review of patient's allergies indicates:   Allergen Reactions    Aloe vera latex gloves [gloves, latex with aloe vera] Rash    Latex Hives and Rash       No current facility-administered medications on file prior to encounter.     Current Outpatient Medications on File Prior to Encounter   Medication Sig    albuterol (PROVENTIL) 2.5 mg /3 mL (0.083 %) nebulizer solution Take 2.5 mg by nebulization every 6 (six) hours as needed for Wheezing or Shortness of Breath. Using ~ 1 time/day recently:  does not uuse when doing well Rescue    amLODIPine (NORVASC) 10 MG  tablet Take 10 mg by mouth once daily.    amLODIPine (NORVASC) 10 MG tablet Take 1 tablet by mouth once daily.    aspirin 81 MG Chew Take 81 mg by mouth once daily.    atorvastatin (LIPITOR) 80 MG tablet Take 80 mg by mouth once daily.    cholecalciferol, vitamin D3, (VITAMIN D3) 25 mcg (1,000 unit) capsule Take 1,000 Units by mouth once daily.    cyanocobalamin (VITAMIN B-12) 1000 MCG tablet Take 100 mcg by mouth once daily.    cyanocobalamin (VITAMIN B-12) 1000 MCG tablet 1,000 mcg.    fluticasone-salmeterol diskus inhaler 250-50 mcg Inhale 1 puff into the lungs 2 (two) times daily. Controller    gabapentin (NEURONTIN) 300 MG capsule Take 600 mg by mouth 3 (three) times daily.    loratadine (CLARITIN) 10 mg tablet Take 10 mg by mouth once daily.    meloxicam (MOBIC) 15 MG tablet Take 7.5 mg by mouth once daily.    methocarbamoL (ROBAXIN) 500 MG Tab Take 500 mg by mouth 2 (two) times daily.    metoprolol tartrate (LOPRESSOR) 100 MG tablet Take 150 mg by mouth 2 (two) times daily. 100 mg tab: 1.5 tab BID    nitroGLYCERIN (NITROSTAT) 0.4 MG SL tablet every 5 (five) minutes as needed. Prn, not recently used    OMEPRAZOLE ORAL Take 20 mg by mouth once daily.    budesonide-formoterol 160-4.5 mcg (SYMBICORT) 160-4.5 mcg/actuation HFAA Inhale 2 puffs into the lungs every 12 (twelve) hours. Controller    chlorpheniramine-phenyleph-DM 4-10-10 mg Tab Take 1 tablet by mouth every 6 (six) hours as needed.    citalopram hydrobromide (CITALOPRAM ORAL) Take by mouth.    fluticasone propionate (FLOVENT DISKUS) 50 mcg/actuation DsDv Inhale into the lungs. Controller    HYDROcodone-acetaminophen (NORCO)  mg per tablet TAKE 1 TABLET TWICE A DAY AS NEEDED FOR 30 DAYS    hydrOXYzine HCL (ATARAX) 25 MG tablet Take 25 mg by mouth 3 (three) times daily.    tiotropium bromide (SPIRIVA RESPIMAT) 2.5 mcg/actuation inhaler Inhale 2 puffs into the lungs Daily. Controller     Family History       Problem Relation (Age  of Onset)    Cancer Father    Heart disease Mother, Father          Tobacco Use    Smoking status: Every Day     Packs/day: 1.00     Types: Cigarettes    Smokeless tobacco: Never   Substance and Sexual Activity    Alcohol use: Never    Drug use: Not on file    Sexual activity: Not Currently     Review of Systems   Constitutional:  Positive for chills and fever.        Body aches   HENT:  Positive for congestion.    Respiratory:  Positive for cough and wheezing.    Objective:     Vital Signs (Most Recent):  Temp: 98.6 °F (37 °C) (01/14/23 2100)  Pulse: 76 (01/14/23 2100)  Resp: 20 (01/14/23 2329)  BP: 127/71 (01/14/23 2100)  SpO2: 96 % (01/14/23 2100) Vital Signs (24h Range):  Temp:  [98.5 °F (36.9 °C)-98.6 °F (37 °C)] 98.6 °F (37 °C)  Pulse:  [76-81] 76  Resp:  [20] 20  SpO2:  [96 %] 96 %  BP: (127-134)/(68-71) 127/71     Weight: 84.4 kg (186 lb)  Body mass index is 29.13 kg/m².    Physical Exam  Vitals and nursing note reviewed. Exam conducted with a chaperone present.   Constitutional:       Appearance: Normal appearance.   HENT:      Head: Normocephalic and atraumatic.      Nose: Nose normal.   Cardiovascular:      Rate and Rhythm: Normal rate and regular rhythm.   Pulmonary:      Breath sounds: Wheezing present.   Skin:     General: Skin is warm.   Neurological:      General: No focal deficit present.      Mental Status: He is alert and oriented to person, place, and time.   Psychiatric:         Mood and Affect: Mood normal.           Significant Labs: All pertinent labs within the past 24 hours have been reviewed.    Significant Imaging: I have reviewed all pertinent imaging results/findings within the past 24 hours.    Assessment/Plan:     No notes have been filed under this hospital service.  Service: Hospital Medicine    VTE Risk Mitigation (From admission, onward)         Ordered     enoxaparin injection 40 mg  Daily         01/14/23 2671                   ANASTACIO José  Department of Hospital  Medicine   [facility]

## 2023-01-15 NOTE — HPI
Patient is a 62-year-old male with history of cough, congestion, body aches, and fever.  He was evaluated in the emergency department, diagnosed with COVID, and pneumonia.    I discussed patient with Dr. Alas, he instructed to admit patient to observation for IV antibiotics, breathing treatment with MDI, and steroids.    At the time of admittance patient stated he was feeling much better, breathing easier, and his body aches had resolved.

## 2023-01-15 NOTE — DISCHARGE SUMMARY
[facility]  Hospital Medicine  Discharge Summary      Patient Name: Jonny Singh  MRN: [id number]  [facility]: [id number]  Patient Class: OP- Observation  Admission Date: 1/14/2023  Hospital Length of Stay: 1 days  Discharge Date and Time:  01/15/2023 11:06 AM  Attending Physician: Jame Alas DO   Discharging Provider: ARNOLDO Holland  Primary Care Provider: Primary Doctor Nelly    Primary Care Team: Networked reference to record PCT     HPI:   Patient is a 62-year-old male with history of cough, congestion, body aches, and fever.  He was evaluated in the emergency department, diagnosed with COVID, and pneumonia.    I discussed patient with Dr. Alas, he instructed to admit patient to observation for IV antibiotics, breathing treatment with MDI, and steroids.    At the time of admittance patient stated he was feeling much better, breathing easier, and his body aches had resolved.          * No surgery found *      Hospital Course:   01/15/2023:  Patient admitted to observation with COVID.  Symptom onset 6 days ago.  Reports congestion, cough, and myalgia.  Reports symptoms are improved as compared to the past few days.  Denies dyspnea.  No change in sputum characteristic.  Longstanding history of smoking.  Has a faint end expiratory wheeze.  Patient reports he wheezes chronically.  Is on a short-acting and long-acting beta agonist as well as inhaled steroid.  Respirations even and nonlabored.  Room air sat 96%.  Chest x-ray from yesterday demonstrated a right lower lobe consolidation concerning for neoplasm versus pneumonia. CT demonstrates linear infiltrates. Advise outpatient follow for repeat imaging for resolution. Patient reports history of right lower lobe neoplasm treatment with radiation several years ago.  Is followed by oncology at [facility].  Not currently undergoing treatment. Will discharge the patient home today.  He is ambulatory and can perform ADLs  independently. Does not have exertional dyspnea. Lives with his girlfriend. Discussed with Dr Aguilar.       Goals of Care Treatment Preferences:  Code Status: Full Code      Consults:     No new Assessment & Plan notes have been filed under this hospital service since the last note was generated.  Service: Hospital Medicine    Final Active Diagnoses:    Diagnosis Date Noted POA    PRINCIPAL PROBLEM:  COVID-19 [U07.1] 01/14/2023 Yes    Community acquired pneumonia [J18.9] 01/14/2023 Yes    COPD (chronic obstructive pulmonary disease) [J44.9] 11/17/2022 Yes      Problems Resolved During this Admission:       Discharged Condition: stable    Disposition: Home or Self Care    Follow Up:    Patient Instructions:   No discharge procedures on file.    Significant Diagnostic Studies: Labs: All labs within the past 24 hours have been reviewed    Pending Diagnostic Studies:     None         Medications:  Reconciled Home Medications:      Medication List      START taking these medications    azithromycin 250 MG tablet  Commonly known as: Z-HILL  Take 1 tablet (250 mg total) by mouth once daily. Take first 2 tablets together, then 1 every day until finished.     cefdinir 300 MG capsule  Commonly known as: OMNICEF  Take 1 capsule (300 mg total) by mouth 2 (two) times daily. for 7 days        CHANGE how you take these medications    amLODIPine 10 MG tablet  Commonly known as: NORVASC  Take 10 mg by mouth once daily.  What changed: Another medication with the same name was removed. Continue taking this medication, and follow the directions you see here.     atorvastatin 80 MG tablet  Commonly known as: LIPITOR  Take 1 tablet (80 mg total) by mouth every evening.  What changed: when to take this     citalopram 10 MG tablet  Commonly known as: CeleXA  Take 1 tablet (10 mg total) by mouth once daily.  What changed:   · medication strength  · how much to take  · when to take this     cyanocobalamin 1000 MCG tablet  Commonly known  as: VITAMIN B-12  1,000 mcg.  What changed: Another medication with the same name was removed. Continue taking this medication, and follow the directions you see here.     hydrOXYzine HCL 25 MG tablet  Commonly known as: ATARAX  Take 1 tablet (25 mg total) by mouth 3 (three) times daily as needed for Itching.  What changed:   · when to take this  · reasons to take this     loratadine 10 mg tablet  Commonly known as: CLARITIN  Take 1 tablet (10 mg total) by mouth nightly as needed for Allergies.  What changed:   · when to take this  · reasons to take this     meloxicam 15 MG tablet  Commonly known as: MOBIC  Take 0.5 tablets (7.5 mg total) by mouth daily as needed for Pain.  What changed:   · when to take this  · reasons to take this     methocarbamoL 500 MG Tab  Commonly known as: ROBAXIN  Take 1 tablet (500 mg total) by mouth 2 (two) times daily as needed (muscle spasm).  What changed:   · when to take this  · reasons to take this        CONTINUE taking these medications    albuterol 2.5 mg /3 mL (0.083 %) nebulizer solution  Commonly known as: PROVENTIL  Take 2.5 mg by nebulization every 6 (six) hours as needed for Wheezing or Shortness of Breath. Using ~ 1 time/day recently:  does not uuse when doing well Rescue     aspirin 81 MG Chew  Take 81 mg by mouth once daily.     cholecalciferol (vitamin D3) 25 mcg (1,000 unit) capsule  Commonly known as: VITAMIN D3  Take 1,000 Units by mouth once daily.     fluticasone-salmeterol 250-50 mcg/dose 250-50 mcg/dose diskus inhaler  Commonly known as: ADVAIR  Inhale 1 puff into the lungs 2 (two) times daily. Controller     gabapentin 300 MG capsule  Commonly known as: NEURONTIN  Take 600 mg by mouth 3 (three) times daily.     HYDROcodone-acetaminophen  mg per tablet  Commonly known as: NORCO  TAKE 1 TABLET TWICE A DAY AS NEEDED FOR 30 DAYS     metoprolol tartrate 100 MG tablet  Commonly known as: LOPRESSOR  Take 150 mg by mouth 2 (two) times daily. 100 mg tab: 1.5 tab  BID     nitroGLYCERIN 0.4 MG SL tablet  Commonly known as: NITROSTAT  every 5 (five) minutes as needed. Prn, not recently used     OMEPRAZOLE ORAL  Take 20 mg by mouth once daily.     SPIRIVA RESPIMAT 2.5 mcg/actuation inhaler  Generic drug: tiotropium bromide  Inhale 2 puffs into the lungs Daily. Controller        STOP taking these medications    budesonide-formoterol 160-4.5 mcg 160-4.5 mcg/actuation Hfaa  Commonly known as: SYMBICORT     chlorpheniramine-phenyleph-DM 4-10-10 mg Tab     fluticasone propionate 50 mcg/actuation Dsdv  Commonly known as: FLOVENT DISKUS            Indwelling Lines/Drains at time of discharge:   Lines/Drains/Airways     None                 Time spent on the discharge of patient: 60 minutes         ARNOLDO Holland  Department of Hospital Medicine  [facility]

## 2023-01-15 NOTE — ED PROVIDER NOTES
Encounter Date: 1/14/2023       History     Chief Complaint   Patient presents with    Nasal Congestion     Cold symptoms with aching      Patient is a 62-year-old male with history of cough, congested for the past 3 days.    He states that he feels feverish, took Tylenol 2 hours ago, body aches.    Patient has a 50 pack-year history of smoking.    He has a past medical history of coronary artery disease, diabetes, hypertension, cancer, arthritis, congestive failure.        Review of patient's allergies indicates:   Allergen Reactions    Aloe vera latex gloves [gloves, latex with aloe vera] Rash    Latex Hives and Rash     Past Medical History:   Diagnosis Date    Arthritis     Cancer     AdenoCarcinoma RLL : XRT  ~ 6525-7203 [place]    CHF (congestive heart failure)     Chronic pain     Coronary artery disease     Diabetes mellitus     Hypertension      Past Surgical History:   Procedure Laterality Date    CARDIAC SURGERY      EYE SURGERY      NECK SURGERY N/A     ~ 2016 [place]    STOMACH SURGERY       Family History   Problem Relation Age of Onset    Heart disease Mother     Cancer Father     Heart disease Father      Social History     Tobacco Use    Smoking status: Every Day     Packs/day: 1.00     Types: Cigarettes    Smokeless tobacco: Never   Substance Use Topics    Alcohol use: Never     Review of Systems   Constitutional:  Positive for chills.        Body aches   HENT:  Positive for congestion.    Respiratory:  Positive for cough and wheezing.    All other systems reviewed and are negative.    Physical Exam     Initial Vitals [01/14/23 1830]   BP Pulse Resp Temp SpO2   134/68 81 20 98.5 °F (36.9 °C) 96 %      MAP       --         Physical Exam    Nursing note and vitals reviewed.  Constitutional: He appears well-developed and well-nourished. No distress.   Smells of cigarettes   HENT:   Head: Normocephalic and atraumatic.   Right Ear: External ear normal.   Left Ear: External ear normal.   Nose: Nose  normal.   Mouth/Throat: Oropharynx is clear and moist. No oropharyngeal exudate.   Eyes: EOM are normal. Pupils are equal, round, and reactive to light.   Neck: Neck supple.   Cardiovascular:  Normal rate, regular rhythm and normal heart sounds.           Pulmonary/Chest: He has wheezes.   Abdominal: Abdomen is soft. Bowel sounds are normal.   Musculoskeletal:      Cervical back: Neck supple.     Neurological: He is alert and oriented to person, place, and time.   Skin: Skin is dry.   Psychiatric: He has a normal mood and affect.       Medical Screening Exam   See Full Note    ED Course   Procedures  Labs Reviewed   SARS-COV2 (COVID) W/ FLU ANTIGEN          Imaging Results    None          Medications   methylPREDNISolone sodium succinate injection 125 mg (has no administration in time range)   albuterol-ipratropium 2.5 mg-0.5 mg/3 mL nebulizer solution 3 mL (has no administration in time range)   ketorolac injection 30 mg (has no administration in time range)                 ED Course as of 01/14/23 1845   Sat Jan 14, 2023 1845 I will test patient for COVID, and flu, if he is negative for COVID I will give him a breathing treatment, if not I will give him an MDI.    Patient will get Toradol for body aches, and will get Solu-Medrol for wheezing.     [CB]      ED Course User Index  [CB] ANASTACIO José          Clinical Impression:   Final diagnoses:  [R06.2] Wheezing               ANASTACIO José  01/14/23 1845

## 2023-01-15 NOTE — H&P
[facility]  Hospital Medicine  History & Physical    Patient Name: Jonny Singh  MRN: [id number]  Patient Class: IP- Inpatient  Admission Date: 1/14/2023  Attending Physician: Jame Alas  Primary Care Provider: Primary Doctor No         Patient information was obtained from     Subjective:     Principal Problem:COVID-19    Chief Complaint:   Chief Complaint   Patient presents with    Nasal Congestion     Cold symptoms with aching         HPI: Patient is a 62-year-old male with history of cough, congestion, body aches, and fever.  He was evaluated in the emergency department, diagnosed with COVID, and pneumonia.    I discussed patient with Dr. Alas, he instructed to admit patient to observation for IV antibiotics, breathing treatment with MDI, and steroids.    At the time of admittance patient stated he was feeling much better, breathing easier, and his body aches had resolved.          No new subjective & objective note has been filed under this hospital service since the last note was generated.    Assessment/Plan:     * COVID-19  Patient is identified as High risk for severe complications of COVID 19 based on COVID risk score of 3   Initiate standard COVID protocols; COVID-19 testing ,Infection Control notification  and isolation- respiratory, contact and droplet per protocol    Diagnostics: (leukopenia, hyponatremia, hyperferritinemia, elevated troponin, elevated d-dimer, age, and comorbidities are significant predictors of poor clinical outcome)    Management:     Patient will get albuterol MDI q.6 hours.    He was placed on Lovenox 40 mg subcutaneous daily for anticoagulation.      Community acquired pneumonia    Patient will get Rocephin 1 g daily IV.       VTE Risk Mitigation (From admission, onward)         Ordered     enoxaparin injection 40 mg  Daily         01/14/23 1831                   ANASTACIO José  Department of Hospital Medicine   [facility]

## 2023-01-21 LAB
BACTERIA BLD CULT: NORMAL
BACTERIA BLD CULT: NORMAL

## 2023-04-17 PROBLEM — J18.9 COMMUNITY ACQUIRED PNEUMONIA: Status: RESOLVED | Noted: 2023-01-14 | Resolved: 2023-04-17

## 2023-05-08 ENCOUNTER — HOSPITAL ENCOUNTER (EMERGENCY)
Facility: HOSPITAL | Age: 63
Discharge: HOME | End: 2023-05-08
Payer: MEDICAID

## 2023-05-08 VITALS
BODY MASS INDEX: 29.35 KG/M2 | HEIGHT: 67 IN | HEART RATE: 72 BPM | OXYGEN SATURATION: 96 % | SYSTOLIC BLOOD PRESSURE: 131 MMHG | RESPIRATION RATE: 16 BRPM | TEMPERATURE: 98 F | WEIGHT: 187 LBS | DIASTOLIC BLOOD PRESSURE: 75 MMHG

## 2023-05-08 DIAGNOSIS — H61.22 IMPACTED CERUMEN OF LEFT EAR: Primary | ICD-10-CM

## 2023-05-08 PROCEDURE — 99282 EMERGENCY DEPT VISIT SF MDM: CPT

## 2023-05-08 PROCEDURE — 99283 PR EMERGENCY DEPT VISIT,LEVEL III: ICD-10-PCS | Mod: ,,, | Performed by: NURSE PRACTITIONER

## 2023-05-08 PROCEDURE — 99283 EMERGENCY DEPT VISIT LOW MDM: CPT | Mod: ,,, | Performed by: NURSE PRACTITIONER

## 2023-05-08 RX ORDER — ASPIRIN 81 MG/1
81 TABLET ORAL DAILY
COMMUNITY
Start: 2023-01-15

## 2023-05-08 RX ORDER — NITROGLYCERIN 0.4 MG/1
0.4 TABLET SUBLINGUAL 4 TIMES DAILY PRN
COMMUNITY
Start: 2023-04-14

## 2023-05-08 RX ORDER — ALBUTEROL SULFATE 90 UG/1
1 AEROSOL, METERED RESPIRATORY (INHALATION) DAILY
COMMUNITY
Start: 2023-04-04

## 2023-05-08 RX ORDER — OMEPRAZOLE 20 MG/1
40 CAPSULE, DELAYED RELEASE ORAL DAILY
COMMUNITY
Start: 2023-02-08

## 2023-05-08 NOTE — DISCHARGE INSTRUCTIONS
Follow up with primary care provider in the morning for ear wax removal.  Tylenol 650 mg every 4-6 hours as needed for pain or discomfort.  Avoid cleaning ears with q-tips.  Return to emergency department for any future emergencies.

## 2023-05-08 NOTE — ED PROVIDER NOTES
"Encounter Date: 5/8/2023       History     Chief Complaint   Patient presents with    Otalgia     Pt c/o left ear pain beginning yesterday. Pt states "I keep a headache and runny nose.", pt denies any other symptoms.      Jonny Singh is a 62 y.o. White /male presenting to ED with left ear pain since yesterday. Denies fever/chills. He does report runny nose but states that he always has this. Denies any other sx. Has not taken anything for pain PTA. Currently in NAD. VSS at this time.      The history is provided by the patient.   Otalgia  This is a new problem. The current episode started yesterday. The problem has been unchanged. Associated symptoms include rhinorrhea. Pertinent negatives include no ear discharge, no sore throat, no neck pain and no cough.   Review of patient's allergies indicates:   Allergen Reactions    Aloe vera latex gloves [gloves, latex with aloe vera] Rash    Latex Hives and Rash     Past Medical History:   Diagnosis Date    Arthritis     Cancer     AdenoCarcinoma RLL : XRT  ~ [id number] [facility]    CHF (congestive heart failure)     Chronic pain     Coronary artery disease     Diabetes mellitus     Hypertension      Past Surgical History:   Procedure Laterality Date    CARDIAC SURGERY      EYE SURGERY      NECK SURGERY N/A     ~ 2016 [place]    STOMACH SURGERY       Family History   Problem Relation Age of Onset    Heart disease Mother     Cancer Father     Heart disease Father      Social History     Tobacco Use    Smoking status: Every Day     Packs/day: 1.00     Types: Cigarettes    Smokeless tobacco: Never   Substance Use Topics    Alcohol use: Never    Drug use: Never     Review of Systems   Constitutional:  Negative for appetite change, chills, fatigue and fever.   HENT:  Positive for ear pain and rhinorrhea. Negative for congestion, ear discharge, postnasal drip, sinus pressure, sinus pain and sore throat.    Eyes:  Negative for discharge, redness, itching and visual " disturbance.   Respiratory:  Negative for cough and shortness of breath.    Cardiovascular:  Negative for chest pain.   Musculoskeletal:  Negative for neck pain.   Neurological:  Negative for dizziness, weakness and light-headedness.   All other systems reviewed and are negative.    Physical Exam     Initial Vitals [05/08/23 1436]   BP Pulse Resp Temp SpO2   131/75 72 16 97.8 °F (36.6 °C) 96 %      MAP       --         Physical Exam    Nursing note and vitals reviewed.  Constitutional: He appears well-developed and well-nourished. No distress.   HENT:   Right Ear: Tympanic membrane normal.   Left Ear: There is tenderness. No drainage. No mastoid tenderness. Decreased hearing is noted.   Cerumen impaction left ear canal         Medical Screening Exam   See Full Note    ED Course   Procedures  Labs Reviewed - No data to display       Imaging Results    None          Medications - No data to display  Medical Decision Making:   Initial Assessment:   Jonny Singh is a 62 y.o. White /male presenting to ED with left ear pain since yesterday. Denies fever/chills. He does report runny nose but states that he always has this. Denies any other sx. Has not taken anything for pain PTA. Currently in NAD. VSS at this time.    Differential Diagnosis:   Otitis media vs otitis externa vs cerumen impaction, vs URI  ED Management:  At this time, I do not feel that radiology studies or lab will add any benefit to care or diagnostics since there is no reported injury, signs of trauma or signs/sx of infection. Cerumen appears very hard and will require softening prior to removal of which we do not have here in the ED. I feel that patient has reached maximum benefit from ED evaluation, treatment and observation. I thoroughly explained all findings to patient to the best of my ability and answered all questions to their satisfaction. I educated patient on the general/expected course of the condition and treatment options in ED and  after discharge. I also informed patient that our evaluation in the emergency department today is an evaluation of the condition in one moment in time. If there is any worsening of the condition of if symptoms persist, the patient has been instructed to return to the ED immediately for further evaluation. Patient plans to f/u with PCP in the AM for cerumen removal. Patient verbalized understanding of the instructions and is agreeable to disposition. No questions or concerns at this time.     Dx:  Cerumen impaction left ear                       Clinical Impression:   Final diagnoses:  [H61.22] Impacted cerumen of left ear (Primary)        ED Disposition Condition    Discharge Stable          ED Prescriptions    None       Follow-up Information    None          Jenny Prieto, ARNOLDO  05/08/23 8203

## 2023-05-09 ENCOUNTER — OFFICE VISIT (OUTPATIENT)
Dept: FAMILY MEDICINE | Facility: CLINIC | Age: 63
End: 2023-05-09
Payer: MEDICAID

## 2023-05-09 VITALS
TEMPERATURE: 98 F | HEIGHT: 67 IN | SYSTOLIC BLOOD PRESSURE: 132 MMHG | DIASTOLIC BLOOD PRESSURE: 78 MMHG | BODY MASS INDEX: 29.53 KG/M2 | HEART RATE: 69 BPM | OXYGEN SATURATION: 98 % | RESPIRATION RATE: 20 BRPM | WEIGHT: 188.13 LBS

## 2023-05-09 DIAGNOSIS — H65.02 NON-RECURRENT ACUTE SEROUS OTITIS MEDIA OF LEFT EAR: ICD-10-CM

## 2023-05-09 DIAGNOSIS — H60.393 OTHER INFECTIVE ACUTE OTITIS EXTERNA OF BOTH EARS: Primary | ICD-10-CM

## 2023-05-09 PROBLEM — E55.9 VITAMIN D DEFICIENCY: Chronic | Status: ACTIVE | Noted: 2023-05-09

## 2023-05-09 PROBLEM — E11.9 WELL CONTROLLED TYPE 2 DIABETES MELLITUS: Status: ACTIVE | Noted: 2023-05-09

## 2023-05-09 PROBLEM — M54.40 LOW BACK PAIN WITH SCIATICA: Status: ACTIVE | Noted: 2023-05-09

## 2023-05-09 PROBLEM — E11.9 WELL CONTROLLED TYPE 2 DIABETES MELLITUS: Chronic | Status: ACTIVE | Noted: 2023-05-09

## 2023-05-09 PROBLEM — K21.9 GASTROESOPHAGEAL REFLUX DISEASE: Chronic | Status: ACTIVE | Noted: 2023-05-09

## 2023-05-09 PROBLEM — U07.1 COVID-19: Status: RESOLVED | Noted: 2023-01-14 | Resolved: 2023-05-09

## 2023-05-09 PROBLEM — Z72.0 TOBACCO USE: Chronic | Status: ACTIVE | Noted: 2022-11-17

## 2023-05-09 PROBLEM — F43.23 ADJUSTMENT DISORDER WITH MIXED ANXIETY AND DEPRESSED MOOD: Chronic | Status: ACTIVE | Noted: 2023-05-09

## 2023-05-09 PROBLEM — K21.9 GASTROESOPHAGEAL REFLUX DISEASE: Status: ACTIVE | Noted: 2023-05-09

## 2023-05-09 PROBLEM — Z85.118 HISTORY OF MALIGNANT NEOPLASM OF LUNG: Status: ACTIVE | Noted: 2023-05-09

## 2023-05-09 PROBLEM — M19.90 OSTEOARTHRITIS: Chronic | Status: ACTIVE | Noted: 2023-05-09

## 2023-05-09 PROBLEM — E66.9 OBESITY: Chronic | Status: ACTIVE | Noted: 2023-05-09

## 2023-05-09 PROBLEM — M54.40 LOW BACK PAIN WITH SCIATICA: Chronic | Status: ACTIVE | Noted: 2023-05-09

## 2023-05-09 PROBLEM — E83.42 HYPOMAGNESEMIA: Status: ACTIVE | Noted: 2023-05-09

## 2023-05-09 PROBLEM — M19.90 OSTEOARTHRITIS: Status: ACTIVE | Noted: 2023-05-09

## 2023-05-09 PROBLEM — Z85.118 HISTORY OF MALIGNANT NEOPLASM OF LUNG: Chronic | Status: ACTIVE | Noted: 2023-05-09

## 2023-05-09 PROBLEM — E55.9 VITAMIN D DEFICIENCY: Status: ACTIVE | Noted: 2023-05-09

## 2023-05-09 PROBLEM — F43.23 ADJUSTMENT DISORDER WITH MIXED ANXIETY AND DEPRESSED MOOD: Status: ACTIVE | Noted: 2023-05-09

## 2023-05-09 PROBLEM — E53.8 COBALAMIN DEFICIENCY: Status: ACTIVE | Noted: 2023-05-09

## 2023-05-09 PROBLEM — E83.42 HYPOMAGNESEMIA: Chronic | Status: ACTIVE | Noted: 2023-05-09

## 2023-05-09 PROBLEM — E53.8 COBALAMIN DEFICIENCY: Chronic | Status: ACTIVE | Noted: 2023-05-09

## 2023-05-09 PROBLEM — E66.9 OBESITY: Status: ACTIVE | Noted: 2023-05-09

## 2023-05-09 PROBLEM — J44.9 COPD (CHRONIC OBSTRUCTIVE PULMONARY DISEASE): Chronic | Status: ACTIVE | Noted: 2022-11-17

## 2023-05-09 PROCEDURE — 1159F PR MEDICATION LIST DOCUMENTED IN MEDICAL RECORD: ICD-10-PCS | Mod: CPTII,,, | Performed by: FAMILY MEDICINE

## 2023-05-09 PROCEDURE — 1159F MED LIST DOCD IN RCRD: CPT | Mod: CPTII,,, | Performed by: FAMILY MEDICINE

## 2023-05-09 PROCEDURE — 3078F DIAST BP <80 MM HG: CPT | Mod: CPTII,,, | Performed by: FAMILY MEDICINE

## 2023-05-09 PROCEDURE — 99213 OFFICE O/P EST LOW 20 MIN: CPT | Mod: ,,, | Performed by: FAMILY MEDICINE

## 2023-05-09 PROCEDURE — 99213 PR OFFICE/OUTPT VISIT, EST, LEVL III, 20-29 MIN: ICD-10-PCS | Mod: ,,, | Performed by: FAMILY MEDICINE

## 2023-05-09 PROCEDURE — 3008F PR BODY MASS INDEX (BMI) DOCUMENTED: ICD-10-PCS | Mod: CPTII,,, | Performed by: FAMILY MEDICINE

## 2023-05-09 PROCEDURE — 1160F PR REVIEW ALL MEDS BY PRESCRIBER/CLIN PHARMACIST DOCUMENTED: ICD-10-PCS | Mod: CPTII,,, | Performed by: FAMILY MEDICINE

## 2023-05-09 PROCEDURE — 3075F SYST BP GE 130 - 139MM HG: CPT | Mod: CPTII,,, | Performed by: FAMILY MEDICINE

## 2023-05-09 PROCEDURE — 3078F PR MOST RECENT DIASTOLIC BLOOD PRESSURE < 80 MM HG: ICD-10-PCS | Mod: CPTII,,, | Performed by: FAMILY MEDICINE

## 2023-05-09 PROCEDURE — 3075F PR MOST RECENT SYSTOLIC BLOOD PRESS GE 130-139MM HG: ICD-10-PCS | Mod: CPTII,,, | Performed by: FAMILY MEDICINE

## 2023-05-09 PROCEDURE — 1160F RVW MEDS BY RX/DR IN RCRD: CPT | Mod: CPTII,,, | Performed by: FAMILY MEDICINE

## 2023-05-09 PROCEDURE — 3008F BODY MASS INDEX DOCD: CPT | Mod: CPTII,,, | Performed by: FAMILY MEDICINE

## 2023-05-09 RX ORDER — CIPROFLOXACIN AND DEXAMETHASONE 3; 1 MG/ML; MG/ML
4 SUSPENSION/ DROPS AURICULAR (OTIC) 2 TIMES DAILY
Qty: 7.5 ML | Refills: 0 | Status: SHIPPED | OUTPATIENT
Start: 2023-05-09 | End: 2023-05-16

## 2023-05-09 RX ORDER — AMOXICILLIN 875 MG/1
875 TABLET, FILM COATED ORAL EVERY 12 HOURS
Qty: 20 TABLET | Refills: 0 | Status: SHIPPED | OUTPATIENT
Start: 2023-05-09 | End: 2023-05-19

## 2023-05-09 NOTE — PROGRESS NOTES
Clinic Note    Patient Name: Jonny Singh  : 1960  MRN: [id number]    Chief Complaint   Patient presents with    Otalgia     Pt states Left ear pain started yesterday        HPI:    Mr. Jonny Singh is a 62 y.o. male who presents to clinic today with CC of L earache X 2 days. Denies fever.   Patient is, otherwise, without complaints.     Medications:  Medication List with Changes/Refills   New Medications    AMOXICILLIN (AMOXIL) 875 MG TABLET    Take 1 tablet (875 mg total) by mouth every 12 (twelve) hours. for 10 days    CIPROFLOXACIN-DEXAMETHASONE 0.3-0.1% (CIPRODEX) 0.3-0.1 % DRPS    Place 4 drops into both ears 2 (two) times daily. for 7 days   Current Medications    ALBUTEROL (PROVENTIL) 2.5 MG /3 ML (0.083 %) NEBULIZER SOLUTION    Take 2.5 mg by nebulization every 6 (six) hours as needed for Wheezing or Shortness of Breath. Using ~ 1 time/day recently:  does not uuse when doing well Rescue    ALBUTEROL (PROVENTIL/VENTOLIN HFA) 90 MCG/ACTUATION INHALER    Inhale 1 puff into the lungs once daily.    AMLODIPINE (NORVASC) 10 MG TABLET    Take 10 mg by mouth once daily.    ASPIRIN (ECOTRIN) 81 MG EC TABLET    Take 81 mg by mouth once daily.    ASPIRIN 81 MG CHEW    Take 81 mg by mouth once daily.    ATORVASTATIN (LIPITOR) 80 MG TABLET    Take 1 tablet (80 mg total) by mouth every evening.    BLOOD SUGAR DIAGNOSTIC STRP    Place 1 strip onto the skin once daily.    CHOLECALCIFEROL, VITAMIN D3, (VITAMIN D3) 25 MCG (1,000 UNIT) CAPSULE    Take 1,000 Units by mouth once daily.    CYANOCOBALAMIN (VITAMIN B-12) 1000 MCG TABLET    1,000 mcg.    FLUTICASONE-SALMETEROL DISKUS INHALER 250-50 MCG    Inhale 1 puff into the lungs 2 (two) times daily. Controller    GABAPENTIN (NEURONTIN) 300 MG CAPSULE    Take 600 mg by mouth 3 (three) times daily.    HYDROCODONE-ACETAMINOPHEN (NORCO)  MG PER TABLET    TAKE 1 TABLET TWICE A DAY AS NEEDED FOR 30 DAYS    HYDROXYZINE HCL (ATARAX) 25 MG TABLET    Take 1  tablet (25 mg total) by mouth 3 (three) times daily as needed for Itching.    IPRATROPIUM-ALBUTEROL (COMBIVENT)  MCG/ACTUATION INHALER    Inhale 4 puffs into the lungs once daily.    LORATADINE (CLARITIN) 10 MG TABLET    Take 1 tablet (10 mg total) by mouth nightly as needed for Allergies.    MELOXICAM (MOBIC) 15 MG TABLET    Take 0.5 tablets (7.5 mg total) by mouth daily as needed for Pain.    METHOCARBAMOL (ROBAXIN) 500 MG TAB    Take 1 tablet (500 mg total) by mouth 2 (two) times daily as needed (muscle spasm).    METOPROLOL TARTRATE (LOPRESSOR) 100 MG TABLET    Take 150 mg by mouth 2 (two) times daily. 100 mg tab: 1.5 tab BID    NITROGLYCERIN (NITROSTAT) 0.4 MG SL TABLET    every 5 (five) minutes as needed. Prn, not recently used    NITROGLYCERIN (NITROSTAT) 0.4 MG SL TABLET    Place 0.4 mg under the tongue 4 (four) times daily as needed.    OMEPRAZOLE (PRILOSEC) 20 MG CAPSULE    Take 40 mg by mouth once daily.    OMEPRAZOLE ORAL    Take 20 mg by mouth once daily.    TIOTROPIUM BROMIDE (SPIRIVA RESPIMAT) 2.5 MCG/ACTUATION INHALER    Inhale 2 puffs into the lungs Daily. Controller        Allergies: Aloe vera latex gloves [gloves, latex with aloe vera] and Latex      Past Medical History:    Past Medical History:   Diagnosis Date    Arthritis     Cancer     AdenoCarcinoma RLL : XRT  ~ [id number] [facility]    CHF (congestive heart failure)     Chronic pain     Coronary artery disease     Diabetes mellitus     Hypertension        Past Surgical History:    Past Surgical History:   Procedure Laterality Date    CARDIAC SURGERY      EYE SURGERY      NECK SURGERY N/A     ~ 2016 [place]    STOMACH SURGERY           Social History:    Social History     Tobacco Use   Smoking Status Every Day    Packs/day: 1.00    Types: Cigarettes   Smokeless Tobacco Never     Social History     Substance and Sexual Activity   Alcohol Use Never     Social History     Substance and Sexual Activity   Drug Use Never         Family  "History:    Family History   Problem Relation Age of Onset    Heart disease Mother     Cancer Father     Heart disease Father        Review of Systems:    Review of Systems   Constitutional:  Negative for appetite change, chills, fatigue, fever and unexpected weight change.   HENT:  Positive for nasal congestion, ear pain, postnasal drip and sinus pressure/congestion. Negative for sore throat.    Eyes:  Negative for visual disturbance.   Respiratory:  Positive for cough. Negative for shortness of breath.         Reports chronic cough related to COPD - unchanged from baseline   Cardiovascular:  Positive for leg swelling. Negative for chest pain.   Gastrointestinal:  Negative for abdominal pain, change in bowel habit, constipation, diarrhea, nausea, vomiting and change in bowel habit.   Musculoskeletal:  Negative for arthralgias.   Integumentary:  Negative for rash.   Neurological:  Positive for headaches. Negative for dizziness.   Psychiatric/Behavioral:  The patient is not nervous/anxious.       Vitals:    Vitals:    05/09/23 1410   BP: 132/78   BP Location: Left arm   Patient Position: Sitting   BP Method: X-Large (Manual)   Pulse: 69   Resp: 20   Temp: 97.7 °F (36.5 °C)   SpO2: 98%   Weight: 85.3 kg (188 lb 2 oz)   Height: 5' 7" (1.702 m)       Body mass index is 29.46 kg/m².    Wt Readings from Last 3 Encounters:   05/09/23 1410 85.3 kg (188 lb 2 oz)   05/08/23 1436 84.8 kg (187 lb)   01/15/23 0611 84.2 kg (185 lb 11.2 oz)   01/14/23 2100 84.4 kg (186 lb)   01/14/23 1830 84.4 kg (186 lb)        Physical Exam:    Physical Exam  Constitutional:       General: He is not in acute distress.     Comments: Appears chronically ill but stable and in no acute distress   HENT:      Ears:      Comments: + erythema ear canals with mild inflammation      Nose: Congestion present.      Mouth/Throat:      Mouth: Mucous membranes are moist.      Pharynx: Oropharynx is clear.   Eyes:      Conjunctiva/sclera: Conjunctivae normal. "   Cardiovascular:      Rate and Rhythm: Normal rate and regular rhythm.      Heart sounds: Normal heart sounds. No murmur heard.  Pulmonary:      Effort: Pulmonary effort is normal. No respiratory distress.      Breath sounds: Normal breath sounds. No wheezing, rhonchi or rales.   Abdominal:      General: Bowel sounds are normal.      Palpations: Abdomen is soft.      Tenderness: There is no abdominal tenderness.   Musculoskeletal:      Cervical back: Neck supple.   Skin:     Findings: No rash.   Neurological:      General: No focal deficit present.      Mental Status: He is alert. Mental status is at baseline.      Comments: Ambulating with assistance of cane   Psychiatric:         Mood and Affect: Mood normal.       Results:    Assessment/Plan:   1. Other infective acute otitis externa of both ears  -     ciprofloxacin-dexAMETHasone 0.3-0.1% (CIPRODEX) 0.3-0.1 % DrpS; Place 4 drops into both ears 2 (two) times daily. for 7 days  Dispense: 7.5 mL; Refill: 0    2. Non-recurrent acute serous otitis media of left ear  -     amoxicillin (AMOXIL) 875 MG tablet; Take 1 tablet (875 mg total) by mouth every 12 (twelve) hours. for 10 days  Dispense: 20 tablet; Refill: 0         Active Problem List with Overview Notes    Diagnosis Date Noted    History of malignant neoplasm of lung 05/09/2023    Well controlled type 2 diabetes mellitus 05/09/2023    Adjustment disorder with mixed anxiety and depressed mood 05/09/2023    Obesity 05/09/2023    Low back pain with sciatica 05/09/2023    Cobalamin deficiency 05/09/2023    Gastroesophageal reflux disease 05/09/2023    Hypomagnesemia 05/09/2023    Osteoarthritis 05/09/2023    Vitamin D deficiency 05/09/2023    COPD (chronic obstructive pulmonary disease) 11/17/2022    Tobacco use 11/17/2022    CAD (coronary artery disease) 12/02/2014    HLD (hyperlipidemia) 12/02/2014    HTN (hypertension) 12/02/2014          RTC prn if symptoms worsen or fail to resolve.  Patient voiced  understanding and is agreeable to plan.      Debbie Castro MD    [facility]

## 2023-05-22 ENCOUNTER — HOSPITAL ENCOUNTER (EMERGENCY)
Facility: HOSPITAL | Age: 63
Discharge: HOME | End: 2023-05-22
Attending: EMERGENCY MEDICINE
Payer: MEDICAID

## 2023-05-22 VITALS
DIASTOLIC BLOOD PRESSURE: 57 MMHG | TEMPERATURE: 98 F | BODY MASS INDEX: 29.35 KG/M2 | WEIGHT: 187 LBS | RESPIRATION RATE: 18 BRPM | HEIGHT: 67 IN | OXYGEN SATURATION: 96 % | SYSTOLIC BLOOD PRESSURE: 111 MMHG | HEART RATE: 67 BPM

## 2023-05-22 DIAGNOSIS — R11.2 NAUSEA AND VOMITING, UNSPECIFIED VOMITING TYPE: ICD-10-CM

## 2023-05-22 DIAGNOSIS — J40 BRONCHITIS: ICD-10-CM

## 2023-05-22 DIAGNOSIS — J06.9 UPPER RESPIRATORY TRACT INFECTION, UNSPECIFIED TYPE: Primary | ICD-10-CM

## 2023-05-22 DIAGNOSIS — R91.8 LUNG MASS: ICD-10-CM

## 2023-05-22 DIAGNOSIS — R05.9 COUGH: ICD-10-CM

## 2023-05-22 LAB
ANION GAP SERPL CALCULATED.3IONS-SCNC: 15 MMOL/L (ref 7–16)
BASOPHILS # BLD AUTO: 0.03 K/UL (ref 0–0.2)
BASOPHILS NFR BLD AUTO: 0.3 % (ref 0–1)
BUN SERPL-MCNC: 14 MG/DL (ref 7–18)
BUN/CREAT SERPL: 10 (ref 6–20)
CALCIUM SERPL-MCNC: 9.6 MG/DL (ref 8.5–10.1)
CHLORIDE SERPL-SCNC: 100 MMOL/L (ref 98–107)
CO2 SERPL-SCNC: 27 MMOL/L (ref 21–32)
CREAT SERPL-MCNC: 1.39 MG/DL (ref 0.7–1.3)
DIFFERENTIAL METHOD BLD: ABNORMAL
EGFR (NO RACE VARIABLE) (RUSH/TITUS): 58 ML/MIN/1.73M2
EOSINOPHIL # BLD AUTO: 0.1 K/UL (ref 0–0.5)
EOSINOPHIL NFR BLD AUTO: 1 % (ref 1–4)
EOSINOPHIL NFR BLD MANUAL: 1 % (ref 1–4)
ERYTHROCYTE [DISTWIDTH] IN BLOOD BY AUTOMATED COUNT: 13.6 % (ref 11.5–14.5)
FLUAV AG UPPER RESP QL IA.RAPID: NEGATIVE
FLUBV AG UPPER RESP QL IA.RAPID: NEGATIVE
GLUCOSE SERPL-MCNC: 120 MG/DL (ref 74–106)
HCT VFR BLD AUTO: 42.4 % (ref 40–54)
HGB BLD-MCNC: 14.1 G/DL (ref 13.5–18)
LYMPHOCYTES # BLD AUTO: 2.28 K/UL (ref 1–4.8)
LYMPHOCYTES NFR BLD AUTO: 21.9 % (ref 27–41)
LYMPHOCYTES NFR BLD MANUAL: 24 % (ref 27–41)
MCH RBC QN AUTO: 29.9 PG (ref 27–31)
MCHC RBC AUTO-ENTMCNC: 33.3 G/DL (ref 32–36)
MCV RBC AUTO: 89.8 FL (ref 80–96)
MONOCYTES # BLD AUTO: 1.09 K/UL (ref 0–0.8)
MONOCYTES NFR BLD AUTO: 10.5 % (ref 2–6)
MONOCYTES NFR BLD MANUAL: 7 % (ref 2–6)
MPC BLD CALC-MCNC: 11.5 FL (ref 9.4–12.4)
NEUTROPHILS # BLD AUTO: 6.9 K/UL (ref 1.8–7.7)
NEUTROPHILS NFR BLD AUTO: 66.3 % (ref 53–65)
NEUTS SEG NFR BLD MANUAL: 68 % (ref 50–62)
NRBC BLD MANUAL-RTO: ABNORMAL %
PLATELET # BLD AUTO: 215 K/UL (ref 150–400)
PLATELET MORPHOLOGY: NORMAL
POTASSIUM SERPL-SCNC: 3.9 MMOL/L (ref 3.5–5.1)
RAPID GROUP A STREP: NEGATIVE
RBC # BLD AUTO: 4.72 M/UL (ref 4.6–6.2)
RBC MORPH BLD: NORMAL
SARS-COV+SARS-COV-2 AG RESP QL IA.RAPID: NEGATIVE
SODIUM SERPL-SCNC: 138 MMOL/L (ref 136–145)
WBC # BLD AUTO: 10.4 K/UL (ref 4.5–11)

## 2023-05-22 PROCEDURE — 99284 EMERGENCY DEPT VISIT MOD MDM: CPT | Mod: 25

## 2023-05-22 PROCEDURE — 96374 THER/PROPH/DIAG INJ IV PUSH: CPT

## 2023-05-22 PROCEDURE — 87428 SARSCOV & INF VIR A&B AG IA: CPT | Performed by: EMERGENCY MEDICINE

## 2023-05-22 PROCEDURE — 85025 COMPLETE CBC W/AUTO DIFF WBC: CPT | Performed by: EMERGENCY MEDICINE

## 2023-05-22 PROCEDURE — 87880 STREP A ASSAY W/OPTIC: CPT | Performed by: EMERGENCY MEDICINE

## 2023-05-22 PROCEDURE — 25000003 PHARM REV CODE 250: Performed by: EMERGENCY MEDICINE

## 2023-05-22 PROCEDURE — 99284 PR EMERGENCY DEPT VISIT,LEVEL IV: ICD-10-PCS | Mod: ,,, | Performed by: EMERGENCY MEDICINE

## 2023-05-22 PROCEDURE — 80048 BASIC METABOLIC PNL TOTAL CA: CPT | Performed by: EMERGENCY MEDICINE

## 2023-05-22 PROCEDURE — 99284 EMERGENCY DEPT VISIT MOD MDM: CPT | Mod: ,,, | Performed by: EMERGENCY MEDICINE

## 2023-05-22 RX ORDER — ATORVASTATIN CALCIUM 80 MG/1
80 TABLET, FILM COATED ORAL DAILY
COMMUNITY

## 2023-05-22 RX ORDER — ONDANSETRON 4 MG/1
4 TABLET, FILM COATED ORAL EVERY 8 HOURS PRN
Qty: 15 TABLET | Refills: 0 | Status: SHIPPED | OUTPATIENT
Start: 2023-05-22 | End: 2023-05-27

## 2023-05-22 RX ORDER — ONDANSETRON 4 MG/1
4 TABLET, ORALLY DISINTEGRATING ORAL
Status: COMPLETED | OUTPATIENT
Start: 2023-05-22 | End: 2023-05-22

## 2023-05-22 RX ORDER — LEVOFLOXACIN 250 MG/1
500 TABLET ORAL
Status: COMPLETED | OUTPATIENT
Start: 2023-05-22 | End: 2023-05-22

## 2023-05-22 RX ORDER — LEVOFLOXACIN 500 MG/1
500 TABLET, FILM COATED ORAL DAILY
Qty: 6 TABLET | Refills: 0 | Status: SHIPPED | OUTPATIENT
Start: 2023-05-22 | End: 2023-05-28

## 2023-05-22 RX ORDER — FAMOTIDINE 10 MG/ML
20 INJECTION INTRAVENOUS
Status: COMPLETED | OUTPATIENT
Start: 2023-05-22 | End: 2023-05-22

## 2023-05-22 RX ADMIN — ONDANSETRON 4 MG: 4 TABLET, ORALLY DISINTEGRATING ORAL at 01:05

## 2023-05-22 RX ADMIN — FAMOTIDINE 20 MG: 10 INJECTION, SOLUTION INTRAVENOUS at 01:05

## 2023-05-22 RX ADMIN — LEVOFLOXACIN 500 MG: 250 TABLET, FILM COATED ORAL at 03:05

## 2023-05-22 NOTE — ED PROVIDER NOTES
Encounter Date: 5/22/2023       History     Chief Complaint   Patient presents with    Headache    Chills    Nausea    Cough    Generalized Body Aches     Onset yesterday am     PT IS A 62 YR OLD WM WITH COUGH,CONGESTION MALAISE AND MYALGIAS ONSET YESTERDAY.PT HAS MINIMAL DYSPNEA WITH HX SAME CHRONICALLY. PT STATES COUGH IS MAINLY DRY WITH OCCASIONAL SPUTUM DESCRIBED AS WHITE AND DENIES HEMOPTYSIS  PT DENIES PAIN, SYNCOPE, URINARY COMPLAINTS.  [facility]G CA HAVING COMPLETED TREATMENT  WITH CT [facility].  PT DENIES COVID EXPOSURE    PT HAS NOT HAD COVID VACCINE     Past Medical History  Diagnosis Date Comments  Coronary artery disease [I25.10]    Diabetes mellitus [E11.9]    Hypertension [I10]    Cancer [C80.1]  AdenoCarcinoma RLL : XRT  ~ 3587-1037 Felix  Arthritis [M19.90]    Chronic pain [G89.29]    CHF (congestive heart failure) [I50.9]          Review of patient's allergies indicates:   Allergen Reactions    Aloe vera latex gloves [gloves, latex with aloe vera] Rash    Latex Hives and Rash     Past Medical History:   Diagnosis Date    Arthritis     Cancer     AdenoCarcinoma RLL : XRT  ~ 3428-3025 Felix    CHF (congestive heart failure)     Chronic pain     Coronary artery disease     Diabetes mellitus     Hypertension      Past Surgical History:   Procedure Laterality Date    CARDIAC SURGERY      EYE SURGERY      NECK SURGERY N/A     ~ 2016 [place]    STOMACH SURGERY       Family History   Problem Relation Age of Onset    Heart disease Mother     Cancer Father     Heart disease Father      Social History     Tobacco Use    Smoking status: Every Day     Packs/day: 1.00     Types: Cigarettes    Smokeless tobacco: Never   Substance Use Topics    Alcohol use: Never    Drug use: Never     Review of Systems   Constitutional: Negative.    HENT:  Positive for congestion.    Eyes: Negative.    Respiratory:  Positive for cough.    Cardiovascular: Negative.    Gastrointestinal:  Negative.    Endocrine: Negative.    Genitourinary: Negative.    Musculoskeletal: Negative.    Allergic/Immunologic: Negative.    Neurological: Negative.      Physical Exam     Initial Vitals [05/22/23 1319]   BP Pulse Resp Temp SpO2   136/73 74 18 98.2 °F (36.8 °C) 95 %      MAP       --         Physical Exam    Constitutional: He appears well-developed and well-nourished. He is cooperative. No distress.   HENT:   Head: Normocephalic and atraumatic.   Right Ear: External ear normal.   Left Ear: External ear normal.   Nose: Nose normal.   Mouth/Throat: Oropharynx is clear and moist. No oropharyngeal exudate.   Eyes: Conjunctivae and EOM are normal. Pupils are equal, round, and reactive to light.   Neck: Trachea normal. Neck supple. No thyromegaly present.   Normal range of motion.  Cardiovascular:  Normal rate, regular rhythm, normal heart sounds and intact distal pulses.           No murmur heard.  Pulses:       Radial pulses are 3+ on the right side and 3+ on the left side.        Dorsalis pedis pulses are 3+ on the right side and 3+ on the left side.   Pulmonary/Chest: He has wheezes (OCCASIONAL , PT STATES NORMAL).   Abdominal: Bowel sounds are normal. He exhibits no distension. There is no abdominal tenderness. There is no rebound.   Musculoskeletal:         General: No tenderness or edema. Normal range of motion.      Right shoulder: Normal.      Left shoulder: Normal.      Right upper arm: Normal.      Left upper arm: Normal.      Right elbow: Normal.      Left elbow: Normal.      Right forearm: Normal.      Left forearm: Normal.      Right wrist: Normal.      Left wrist: Normal.      Right hand: Normal.      Left hand: Normal.      Cervical back: Normal range of motion and neck supple.     Lymphadenopathy:     He has no cervical adenopathy.     He has no axillary adenopathy.   Neurological: He is alert and oriented to person, place, and time. He has normal strength. No cranial nerve deficit or sensory  deficit. He displays a negative Romberg sign. GCS eye subscore is 4. GCS verbal subscore is 5. GCS motor subscore is 6.   Skin: Skin is warm and dry. Capillary refill takes less than 2 seconds. No rash noted. No erythema.   Psychiatric: He has a normal mood and affect. His speech is normal and behavior is normal. Judgment and thought content normal. Cognition and memory are normal.       Medical Screening Exam   See Full Note    ED Course   Procedures  Labs Reviewed   BASIC METABOLIC PANEL - Abnormal; Notable for the following components:       Result Value    Glucose 120 (*)     Creatinine 1.39 (*)     eGFR 58 (*)     All other components within normal limits   CBC WITH DIFFERENTIAL - Abnormal; Notable for the following components:    Neutrophils % 66.3 (*)     Lymphocytes % 21.9 (*)     Monocytes % 10.5 (*)     Monocytes, Absolute 1.09 (*)     All other components within normal limits   MANUAL DIFFERENTIAL - Abnormal; Notable for the following components:    Segmented Neutrophils, Man % 68 (*)     Lymphocytes, Man % 24 (*)     Monocytes, Man % 7 (*)     All other components within normal limits   THROAT SCREEN, RAPID STREP - Normal   SARS-COV2 (COVID) W/ FLU ANTIGEN - Normal    Narrative:     Negative SARS-CoV results should not be used as the sole basis for treatment or patient management decisions; negative results should be considered in the context of a patient's recent exposures, history and the presene of clinical signs and symptoms consistent with COVID-19.  Negative results should be treated as presumptive and confirmed by molecular assay, if necessary for patient management.   CBC W/ AUTO DIFFERENTIAL    Narrative:     The following orders were created for panel order CBC Auto Differential.  Procedure                               Abnormality         Status                     ---------                               -----------         ------                     CBC with Differential[[id number]]         Abnormal            Final result               Manual Differential[[id number]]          Abnormal            Final result                 Please view results for these tests on the individual orders.          Imaging Results              X-Ray Chest PA And Lateral (Final result)  Result time 05/22/23 14:08:38      Final result by Aaron Meza DO (05/22/23 14:08:38)                   Impression:      Opacity within the right lower lung is more conspicuous on current exam measures up to 3.7 cm.  Consider repeat chest CT.  While this may reflect sequela of pulmonary scar, follow-up chest CT is recommended.    Point of Service: [facility]      Electronically signed by: Aaron Meza  Date:    05/22/2023  Time:    14:08               Narrative:    EXAMINATION:  XR CHEST PA AND LATERAL    CLINICAL HISTORY:  Cough, unspecified    COMPARISON:  Chest x-ray January 14, 2023    TECHNIQUE:  Frontal and lateral views of the chest.    FINDINGS:  Prior sternotomy.  Heart size appears within normal limits.  Opacity within the right lower lung is more conspicuous on current exam measures up to 3.7 cm.  Consider repeat chest CT.  Visualized osseous and surrounding soft tissue structures appear grossly unchanged.                                    X-Rays:   Independently Interpreted Readings:   Chest X-Ray: FINDINGS:  Prior sternotomy.  Heart size appears within normal limits.  Opacity within the right lower lung is more conspicuous on current exam measures up to 3.7 cm.  Consider repeat chest CT.  Visualized osseous and surrounding soft tissue structures appear grossly unchanged.     Impression:     Opacity within the right lower lung is more conspicuous on current exam measures up to 3.7 cm.  Consider repeat chest CT.  While this may reflect sequela of pulmonary scar, follow-up chest CT is recommended.     Point of Service: [facility]        Electronically signed by: Aaron Meza  Date:                                             05/22/2023  Time:                                           14:08        Exam Ended: 05/22/23 14:04 Last Resulted: 05/22/23 14:08          HX LUNG CA   Medications   ondansetron disintegrating tablet 4 mg (4 mg Oral Given 5/22/23 1346)   famotidine (PF) injection 20 mg (20 mg Intravenous Given 5/22/23 1346)   levoFLOXacin tablet 500 mg (500 mg Oral Given 5/22/23 1517)     Medical Decision Making:   Initial Assessment:   PT IS A 62 YR OLD WM WITH COUGH,CONGESTION MALAISE AND MYALGIAS ONSET YESTERDAY.PT HAS MINIMAL DYSPNEA WITH HX SAME CHRONICALLY. PT STATES COUGH IS MAINLY DRY WITH OCCASIONAL SPUTUM DESCRIBED AS WHITE AND DENIES HEMOPTYSIS  PT DENIES PAIN, SYNCOPE, URINARY COMPLAINTS.  [facility] LUNG CA HAVING COMPLETED TREATMENT  WITH CT [facility]LLOW [facility].  PT DENIES COVID EXPOSURE    PT HAS NOT HAD COVID VACCINE     Past Medical History  Diagnosis Date Comments  Coronary artery disease [I25.10]    Diabetes mellitus [E11.9]    Hypertension [I10]    Cancer [C80.1]  AdenoCarcinoma RLL : XRT  ~ 8627-5818 [place]  Arthritis [M19.90]    Chronic pain [G89.29]    CHF (congestive heart failure) [I50.9]        Differential Diagnosis:   BRONCHITIS, PNA, COVID, FLU  ABNORMAL LABS   ABNORMAL CXR  Clinical Tests:   Lab Tests: Ordered and Reviewed       <> Summary of Lab: Results - Labs    Updated   Order   05/22/23 1422  CBC Auto Differential   Collected: 05/22/23 1346  Final result  Specimen: Blood         05/22/23 1422  CBC with Differential   Collected: 05/22/23 1346  Final result  Specimen: Blood      WBC 10.40 K/uL  RBC 4.72 M/uL  Hemoglobin 14.1 g/dL  Hematocrit 42.4 %  MCV 89.8 fL  MCH 29.9 pg  MCHC 33.3 g/dL  RDW 13.6 %  Platelet Count 215 K/uL  MPV 11.5 fL  Neutrophils % 66.3 High  %  Lymphocytes % 21.9 Low  %  Neutrophils, Abs 6.90 K/uL  Lymphocytes, Absolute 2.28 K/uL  Diff Type Manual  Monocytes % 10.5 High  %  Eosinophils % 1.0 %  Basophils % 0.3 %  Monocytes,  Absolute 1.09 High  K/uL  Eosinophils, Absolute 0.10 K/uL  Basophils, Absolute 0.03 K/uL       05/22/23 1422  Manual Differential   Collected: 05/22/23 1346  Final result  Specimen: Blood      Segmented Neutrophils, Man % 68 High  %  Lymphocytes, Man % 24 Low  %  Monocytes, Man % 7 High  %  Eosinophils, Man % 1 %  nRBC, Manual -  Platelet Morphology Normal  RBC Morphology Normal       05/22/23 1357  Basic Metabolic Panel   Collected: 05/22/23 1346  Final result  Specimen: Blood      Sodium 138 mmol/L  Potassium 3.9 mmol/L  Chloride 100 mmol/L  CO2 27 mmol/L  Anion Gap 15 mmol/L  Glucose 120 High  mg/dL  BUN 14 mg/dL  Creatinine 1.39 High  mg/dL  BUN/Creatinine Ratio 10  Calcium 9.6 mg/dL  eGFR 58 Low  mL/min/1.73m2       05/22/23 1408  SARS-CoV2 (COVID) with FLU Antigen   Collected: 05/22/23 1331  Final result  Specimen: Respiratory from Nasopharyngeal      Influenza A Negative  Influenza B Negative  COVID-19 Ag Negative       05/22/23 1408  Rapid strep screen   Collected: 05/22/23 1331  Final result  Specimen: Throat      Rapid Group A Strep Negative            ED Management:  EXAM  LABS AS ABOVE  CXR LUNG CA ON R SLIGHTLY SMALLER THAT PRIOR  WITH CT RECOMMENDED AND PT STATES [facility] WILL [facility] FOLLOW UP  PT IS STABLE FOR DC AND HAS NEBS AT HOME  [facility]        INCREASE REST AND FLUIDS  CLEAR LIQUIDS FOR 1 DAYS THEN BLAND DIET  MEDICATIONS AS DIRECTED  TYLENOL AS NEEDED  FOLLOW UP WITH [facility] RE CT OF CHEST ON R LUNG MASS  FOR WORSENING [facility] IN [place].                       Clinical Impression:   Final diagnoses:  [R05.9] Cough  [J06.9] Upper respiratory tract infection, unspecified type (Primary)  [R11.2] Nausea and vomiting, unspecified vomiting type  [R91.8] Lung mass  [J40] Bronchitis        ED Disposition Condition    Discharge Stable          ED Prescriptions       Medication Sig Dispense Start Date End Date Auth. Provider    levoFLOXacin (LEVAQUIN) 500 MG tablet Take 1 tablet (500  mg total) by mouth once daily. for 6 days 6 tablet 5/22/2023 5/28/2023 Elaine Baker MD    ondansetron (ZOFRAN) 4 MG tablet Take 1 tablet (4 mg total) by mouth every 8 (eight) hours as needed for Nausea. 15 tablet 5/22/2023 5/27/2023 Elaine Baker MD          Follow-up Information       Follow up With Specialties Details Why Contact Info    [facility]  In 1 week If symptoms worsen SOONER              Elaine Baker MD  05/23/23 6084

## 2023-05-22 NOTE — DISCHARGE INSTRUCTIONS
INCREASE REST AND FLUIDS  CLEAR LIQUIDS FOR 1 DAYS THEN BLAND DIET  MEDICATIONS AS DIRECTED  TYLENOL AS NEEDED  FOLLOW UP WITH [facility] RE CT OF CHEST ON R LUNG MASS  FOR WORSENING [facility] IN [place].

## 2023-05-27 ENCOUNTER — HOSPITAL ENCOUNTER (EMERGENCY)
Facility: HOSPITAL | Age: 63
Discharge: HOME | End: 2023-05-27
Payer: MEDICAID

## 2023-05-27 VITALS
TEMPERATURE: 98 F | HEART RATE: 73 BPM | SYSTOLIC BLOOD PRESSURE: 110 MMHG | DIASTOLIC BLOOD PRESSURE: 63 MMHG | HEIGHT: 67 IN | OXYGEN SATURATION: 96 % | BODY MASS INDEX: 28.25 KG/M2 | WEIGHT: 180 LBS | RESPIRATION RATE: 18 BRPM

## 2023-05-27 DIAGNOSIS — R05.9 COUGH: ICD-10-CM

## 2023-05-27 DIAGNOSIS — J44.1 COPD EXACERBATION: Primary | ICD-10-CM

## 2023-05-27 PROCEDURE — 96372 THER/PROPH/DIAG INJ SC/IM: CPT | Performed by: NURSE PRACTITIONER

## 2023-05-27 PROCEDURE — 99284 PR EMERGENCY DEPT VISIT,LEVEL IV: ICD-10-PCS | Mod: ,,, | Performed by: NURSE PRACTITIONER

## 2023-05-27 PROCEDURE — 63600175 PHARM REV CODE 636 W HCPCS: Performed by: NURSE PRACTITIONER

## 2023-05-27 PROCEDURE — 99284 EMERGENCY DEPT VISIT MOD MDM: CPT | Mod: 25

## 2023-05-27 PROCEDURE — 99284 EMERGENCY DEPT VISIT MOD MDM: CPT | Mod: ,,, | Performed by: NURSE PRACTITIONER

## 2023-05-27 PROCEDURE — 25000242 PHARM REV CODE 250 ALT 637 W/ HCPCS: Performed by: NURSE PRACTITIONER

## 2023-05-27 RX ORDER — IPRATROPIUM BROMIDE AND ALBUTEROL SULFATE 2.5; .5 MG/3ML; MG/3ML
3 SOLUTION RESPIRATORY (INHALATION)
Status: COMPLETED | OUTPATIENT
Start: 2023-05-27 | End: 2023-05-27

## 2023-05-27 RX ORDER — DEXAMETHASONE SODIUM PHOSPHATE 4 MG/ML
8 INJECTION, SOLUTION INTRA-ARTICULAR; INTRALESIONAL; INTRAMUSCULAR; INTRAVENOUS; SOFT TISSUE
Status: COMPLETED | OUTPATIENT
Start: 2023-05-27 | End: 2023-05-27

## 2023-05-27 RX ORDER — PREDNISONE 50 MG/1
50 TABLET ORAL DAILY
Qty: 5 TABLET | Refills: 0 | Status: SHIPPED | OUTPATIENT
Start: 2023-05-27 | End: 2023-06-01

## 2023-05-27 RX ADMIN — IPRATROPIUM BROMIDE AND ALBUTEROL SULFATE 3 ML: 2.5; .5 SOLUTION RESPIRATORY (INHALATION) at 03:05

## 2023-05-27 RX ADMIN — DEXAMETHASONE SODIUM PHOSPHATE 8 MG: 4 INJECTION, SOLUTION INTRA-ARTICULAR; INTRALESIONAL; INTRAMUSCULAR; INTRAVENOUS; SOFT TISSUE at 03:05

## 2023-05-27 NOTE — ED TRIAGE NOTES
Pt presents to ed c/o cough, congestion, and runny nose that started on Monday. Pt states he was seen at ER in [place] and states he feels no better.

## 2023-05-27 NOTE — DISCHARGE INSTRUCTIONS
Take prednisone as directed. Use your albuterol every 4 hours x 2 days, then every 4-6 hours as needed. Follow up with your primary care provider in 2 days; you need to discuss scheduling a CT of your chest. Return to the emergency department for any increase in symptoms or for any other new or worrisome symptoms.

## 2023-05-27 NOTE — ED PROVIDER NOTES
Encounter Date: 5/27/2023       History     Chief Complaint   Patient presents with    Cough     62-year-old male presents to the emergency department to be evaluated for cough that began 5 days ago.  He has been taking Levaquin.  He said that he just does not feel a whole lot better and was told to return if his symptoms did not resolve.     The history is provided by the patient.   Review of patient's allergies indicates:   Allergen Reactions    Aloe vera latex gloves [gloves, latex with aloe vera] Rash    Latex Hives and Rash     Past Medical History:   Diagnosis Date    Arthritis     Cancer     AdenoCarcinoma RLL : XRT  ~ 3184-7588 Felix    CHF (congestive heart failure)     Chronic pain     Coronary artery disease     Diabetes mellitus     Hypertension      Past Surgical History:   Procedure Laterality Date    CARDIAC SURGERY      EYE SURGERY      NECK SURGERY N/A     ~ 2016 [place]    STOMACH SURGERY       Family History   Problem Relation Age of Onset    Heart disease Mother     Cancer Father     Heart disease Father      Social History     Tobacco Use    Smoking status: Every Day     Packs/day: 1.00     Types: Cigarettes    Smokeless tobacco: Never   Substance Use Topics    Alcohol use: Never    Drug use: Never     Review of Systems   Constitutional:  Negative for chills and fever.   Respiratory:  Positive for cough. Negative for shortness of breath.    Cardiovascular:  Negative for chest pain.   All other systems reviewed and are negative.    Physical Exam     Initial Vitals [05/27/23 1422]   BP Pulse Resp Temp SpO2   110/63 73 18 98 °F (36.7 °C) 96 %      MAP       --         Physical Exam    Vitals reviewed.  Constitutional: He appears well-developed and well-nourished.   Neck: Neck supple.   Cardiovascular:  Normal rate and regular rhythm.           Pulmonary/Chest: He has wheezes.   Abdominal: Abdomen is soft. Bowel sounds are normal. He exhibits no distension and no mass. There is no abdominal  tenderness. There is no rebound and no guarding.   Musculoskeletal:         General: Normal range of motion.      Cervical back: Neck supple.     Neurological: He is alert and oriented to person, place, and time. He has normal strength. GCS score is 15. GCS eye subscore is 4. GCS verbal subscore is 5. GCS motor subscore is 6.   Skin: Skin is warm and dry. Capillary refill takes less than 2 seconds.   Psychiatric: He has a normal mood and affect.       Medical Screening Exam   See Full Note    ED Course   Procedures  Labs Reviewed - No data to display       Imaging Results              X-Ray Chest PA And Lateral (Final result)  Result time 23 15:38:15      Final result by Aaron Meza DO (23 15:38:15)                   Impression:      No acute cardiopulmonary process demonstrated.    Point of Service: [facility]      Electronically signed by: Aaron Meza  Date:    2023  Time:    15:38               Narrative:    EXAMINATION:  XR CHEST PA AND LATERAL    CLINICAL HISTORY:  Cough, unspecified    COMPARISON:  Chest x-ray May 22, 2023    TECHNIQUE:  Frontal and lateral views of the chest.    FINDINGS:  The cardiomediastinal silhouette is stable in configuration.  Prior sternotomy chronic mild scar-like opacity within the right lung base.  No new focal pulmonary consolidation.  Visualized osseous and surrounding soft tissue structures appear grossly unchanged.                                       Medications   albuterol-ipratropium 2.5 mg-0.5 mg/3 mL nebulizer solution 3 mL (3 mLs Nebulization Given 23 1502)   dexAMETHasone injection 8 mg (8 mg Intramuscular Given 23 1502)     Medical Decision Makin-year-old male presents to the emergency department to be evaluated for cough that began 5 days ago.  He has been taking Levaquin.  He said that he just does not feel a whole lot better and was told to return if his symptoms did not resolve.   I ordered X-rays and personally  reviewed them and reviewed the radiologist interpretation.  Xray significant for no acute process  Diagnosis: COPD exacerbation  Patient was managed in the ED with IM decadron, duoneb.    The response to treatment was improved.    Prescribed prednisone  Patient was discharged in stable condition.  Detailed return precautions discussed.                         Clinical Impression:   Final diagnoses:  [R05.9] Cough  [J44.1] COPD exacerbation (Primary)        ED Disposition Condition    Discharge Stable          ED Prescriptions       Medication Sig Dispense Start Date End Date Auth. Provider    predniSONE (DELTASONE) 50 MG Tab Take 1 tablet (50 mg total) by mouth once daily. for 5 days 5 tablet 5/27/2023 6/1/2023 ARNOLDO Givens          Follow-up Information    None          ARNOLDO Givens  05/27/23 1540

## 2023-08-27 ENCOUNTER — HOSPITAL ENCOUNTER (EMERGENCY)
Facility: HOSPITAL | Age: 63
Discharge: HOME | End: 2023-08-27
Attending: FAMILY MEDICINE
Payer: MEDICAID

## 2023-08-27 VITALS
WEIGHT: 187 LBS | DIASTOLIC BLOOD PRESSURE: 73 MMHG | HEIGHT: 67 IN | RESPIRATION RATE: 20 BRPM | OXYGEN SATURATION: 95 % | HEART RATE: 66 BPM | TEMPERATURE: 98 F | SYSTOLIC BLOOD PRESSURE: 123 MMHG | BODY MASS INDEX: 29.35 KG/M2

## 2023-08-27 DIAGNOSIS — S39.012D STRAIN OF LUMBAR REGION, SUBSEQUENT ENCOUNTER: ICD-10-CM

## 2023-08-27 DIAGNOSIS — M54.9 BACK PAIN, UNSPECIFIED BACK LOCATION, UNSPECIFIED BACK PAIN LATERALITY, UNSPECIFIED CHRONICITY: Primary | ICD-10-CM

## 2023-08-27 PROCEDURE — 99284 PR EMERGENCY DEPT VISIT,LEVEL IV: ICD-10-PCS | Mod: ,,, | Performed by: FAMILY MEDICINE

## 2023-08-27 PROCEDURE — 99284 EMERGENCY DEPT VISIT MOD MDM: CPT | Mod: ,,, | Performed by: FAMILY MEDICINE

## 2023-08-27 PROCEDURE — 63600175 PHARM REV CODE 636 W HCPCS: Performed by: FAMILY MEDICINE

## 2023-08-27 PROCEDURE — 99284 EMERGENCY DEPT VISIT MOD MDM: CPT

## 2023-08-27 PROCEDURE — 96372 THER/PROPH/DIAG INJ SC/IM: CPT | Performed by: FAMILY MEDICINE

## 2023-08-27 RX ORDER — METHYLPREDNISOLONE ACETATE 40 MG/ML
40 INJECTION, SUSPENSION INTRA-ARTICULAR; INTRALESIONAL; INTRAMUSCULAR; SOFT TISSUE
Status: COMPLETED | OUTPATIENT
Start: 2023-08-27 | End: 2023-08-27

## 2023-08-27 RX ORDER — TIZANIDINE 4 MG/1
4 TABLET ORAL EVERY 6 HOURS PRN
Qty: 30 TABLET | Refills: 0 | Status: SHIPPED | OUTPATIENT
Start: 2023-08-27 | End: 2023-09-06

## 2023-08-27 RX ORDER — KETOROLAC TROMETHAMINE 30 MG/ML
60 INJECTION, SOLUTION INTRAMUSCULAR; INTRAVENOUS
Status: COMPLETED | OUTPATIENT
Start: 2023-08-27 | End: 2023-08-27

## 2023-08-27 RX ORDER — DEXAMETHASONE SODIUM PHOSPHATE 4 MG/ML
4 INJECTION, SOLUTION INTRA-ARTICULAR; INTRALESIONAL; INTRAMUSCULAR; INTRAVENOUS; SOFT TISSUE
Status: COMPLETED | OUTPATIENT
Start: 2023-08-27 | End: 2023-08-27

## 2023-08-27 RX ADMIN — DEXAMETHASONE SODIUM PHOSPHATE 4 MG: 4 INJECTION, SOLUTION INTRA-ARTICULAR; INTRALESIONAL; INTRAMUSCULAR; INTRAVENOUS; SOFT TISSUE at 11:08

## 2023-08-27 RX ADMIN — KETOROLAC TROMETHAMINE 60 MG: 30 INJECTION, SOLUTION INTRAMUSCULAR at 11:08

## 2023-08-27 RX ADMIN — METHYLPREDNISOLONE ACETATE 40 MG: 40 INJECTION, SUSPENSION INTRA-ARTICULAR; INTRALESIONAL; INTRAMUSCULAR; SOFT TISSUE at 11:08

## 2023-08-28 NOTE — ED NOTES
Patient c/o left back & leg pain that got worse early this morning.  Last had Norco at approximately  2000 & it has not helped.  Rates pain at 9 of 10.  Respirations are even & unlabored.  Patient uses a cane to ambulate.  History on chronic back & leg pain.  No edema noted to bilateral lower extremities.

## 2023-08-28 NOTE — ED PROVIDER NOTES
Encounter Date: 8/27/2023       History     Chief Complaint   Patient presents with    Back Pain    Leg Pain     Patient states that his back & leg started hurting really bad early this morning.  States he has chronic leg & back pain & has an appointment with a specialist on 09/14/23.     Patient with pain to the back now radiating down his right leg.  Ongoing for months has chronic pain but has gotten worse over last 2-3 days.  Has an appointment see Dr. Eller this month for pain treatment        Review of patient's allergies indicates:   Allergen Reactions    Aloe vera latex gloves [gloves, latex with aloe vera] Rash    Latex Hives and Rash     Past Medical History:   Diagnosis Date    Arthritis     Cancer     AdenoCarcinoma RLL : XRT  ~ 4390-4801 Felix    CHF (congestive heart failure)     Chronic pain     Coronary artery disease     Diabetes mellitus     Hypertension     Mixed hyperlipidemia      Past Surgical History:   Procedure Laterality Date    CARDIAC SURGERY      EYE SURGERY      hand fracture repair      NECK SURGERY N/A     ~ 2016 [place]    STOMACH SURGERY       Family History   Problem Relation Age of Onset    Heart disease Mother     Cancer Father     Heart disease Father      Social History     Tobacco Use    Smoking status: Every Day     Current packs/day: 1.00     Types: Cigarettes    Smokeless tobacco: Never   Substance Use Topics    Alcohol use: Never    Drug use: Never     Review of Systems   Constitutional:  Positive for fatigue. Negative for fever.   HENT: Negative.  Negative for sore throat.    Eyes: Negative.    Respiratory: Negative.  Negative for shortness of breath.    Cardiovascular: Negative.  Negative for chest pain.   Gastrointestinal: Negative.  Negative for nausea.   Endocrine: Negative.    Genitourinary: Negative.  Negative for dysuria.   Musculoskeletal:  Positive for back pain.   Skin: Negative.  Negative for rash.   Allergic/Immunologic: Negative.    Neurological: Negative.   Negative for weakness.   Hematological: Negative.  Does not bruise/bleed easily.   Psychiatric/Behavioral: Negative.         Physical Exam     Initial Vitals [08/27/23 2220]   BP Pulse Resp Temp SpO2   122/72 71 20 97.9 °F (36.6 °C) 95 %      MAP       --         Physical Exam    Constitutional: He appears well-developed and well-nourished.   HENT:   Head: Normocephalic and atraumatic.   Right Ear: External ear normal.   Left Ear: External ear normal.   Nose: Nose normal.   Mouth/Throat: Oropharynx is clear and moist.   Eyes: Conjunctivae and EOM are normal. Pupils are equal, round, and reactive to light.   Neck: Neck supple.   Normal range of motion.  Cardiovascular:  Normal rate, regular rhythm, normal heart sounds and intact distal pulses.           Pulmonary/Chest: Breath sounds normal.   Abdominal: Abdomen is soft. Bowel sounds are normal.   Genitourinary:    Prostate and penis normal.     Musculoskeletal:         General: Normal range of motion.      Cervical back: Normal range of motion and neck supple.      Comments: Lower back pain with pain radiation down the right leg     Neurological: He is alert and oriented to person, place, and time. He has normal strength and normal reflexes.   Skin: Skin is warm and dry.   Psychiatric: He has a normal mood and affect. His behavior is normal. Judgment and thought content normal.         Medical Screening Exam   See Full Note    ED Course   Procedures  Labs Reviewed - No data to display       Imaging Results    None          Medications   dexAMETHasone injection 4 mg (has no administration in time range)   methylPREDNISolone acetate injection 40 mg (has no administration in time range)   ketorolac injection 60 mg (60 mg Intramuscular Given 8/27/23 2310)     Medical Decision Making  Risk  Prescription drug management.                          Medical Decision Making:   Initial Assessment:   Lower back pain with pain radiation down the back of the right leg right leg  sciatica  Differential Diagnosis:   Right leg sciatica  ED Management:  Toradol    Decadron and Depo-Medrol      Clinical Impression:   Final diagnoses:  [M54.9] Back pain, unspecified back location, unspecified back pain laterality, unspecified chronicity (Primary)  [S39.012D] Strain of lumbar region, subsequent encounter        ED Disposition Condition    Discharge Stable          ED Prescriptions       Medication Sig Dispense Start Date End Date Auth. Provider    tiZANidine (ZANAFLEX) 4 MG tablet Take 1 tablet (4 mg total) by mouth every 6 (six) hours as needed. 30 tablet 8/27/2023 9/6/2023 Jame Alas,           Follow-up Information    None          Jame Alas,   08/27/23 4307

## 2023-08-28 NOTE — ED NOTES
Instructed patient to pickup his medication at the pharmacy Monday & take as directed.  DO NOT drive or operate machinery until you know how this medication will effect you.  Take all home medications as directed.  Keep all scheduled appointments with your other physicians.  Follow-up with PCP if not better in 2-3 days.  May apply warm compresses or ice packs to effected areas for 20 minutes every 2 hours as needed.  May use your TENS Unit as directed & as needed.  Return to ED for any new or worsening symptoms.  Patient verbalized understanding of all instructions.

## 2023-08-29 ENCOUNTER — OFFICE VISIT (OUTPATIENT)
Dept: PULMONOLOGY | Facility: CLINIC | Age: 63
End: 2023-08-29
Payer: OTHER GOVERNMENT

## 2023-08-29 VITALS
BODY MASS INDEX: 29.35 KG/M2 | OXYGEN SATURATION: 97 % | DIASTOLIC BLOOD PRESSURE: 64 MMHG | SYSTOLIC BLOOD PRESSURE: 132 MMHG | HEART RATE: 69 BPM | WEIGHT: 187 LBS | HEIGHT: 67 IN | RESPIRATION RATE: 18 BRPM

## 2023-08-29 DIAGNOSIS — J42 CHRONIC BRONCHITIS, UNSPECIFIED CHRONIC BRONCHITIS TYPE: Chronic | ICD-10-CM

## 2023-08-29 DIAGNOSIS — R93.89 ABNORMAL CT OF THE CHEST: Primary | ICD-10-CM

## 2023-08-29 PROCEDURE — 99215 OFFICE O/P EST HI 40 MIN: CPT | Mod: PBBFAC | Performed by: STUDENT IN AN ORGANIZED HEALTH CARE EDUCATION/TRAINING PROGRAM

## 2023-08-29 PROCEDURE — 99214 PR OFFICE/OUTPT VISIT, EST, LEVL IV, 30-39 MIN: ICD-10-PCS | Mod: S$PBB,,, | Performed by: STUDENT IN AN ORGANIZED HEALTH CARE EDUCATION/TRAINING PROGRAM

## 2023-08-29 PROCEDURE — 99214 OFFICE O/P EST MOD 30 MIN: CPT | Mod: S$PBB,,, | Performed by: STUDENT IN AN ORGANIZED HEALTH CARE EDUCATION/TRAINING PROGRAM

## 2023-08-29 NOTE — ASSESSMENT & PLAN NOTE
Stable.  No recent exacerbation history.  Managed with Wixela b.i.d. and albuterol as needed.  [facility] agree with current management and continue chronic therapy with inhaled ICS-LABA and YOSEPH p.r.n.

## 2023-08-29 NOTE — ASSESSMENT & PLAN NOTE
63-year-old male with ongoing nicotine dependence and greater than 60 pack-years presents with abnormal CT chest from outside hospital.  CT chest from January 2023 reviewed and report from 08/20/2023 concerning for progression in disease.  Will request imaging to guide next steps in management given concern for malignancy.  Discussed with patient possible biopsy with which he is amenable with goal to pursue treatment.

## 2023-08-29 NOTE — PATIENT INSTRUCTIONS
We will follow up with the imaging performed at [facility].   If there is a change in your lung nodule, you may need a biopsy.

## 2023-08-29 NOTE — PROGRESS NOTES
[facility]  Pulmonology  NEW VISIT     Patient Name:  Jonny Singh  Primary Care Provider: Nelly, Primary Doctor  Reason for Referral: Lung nodule  Date of Service: 08/29/2023      Chief Complaint: Abnormal chest scan    SUBJECTIVE   HPI:  Jonny Singh is a 63 y.o. male with COPD, CAD, hypertension, GERD and prior history of RLL adenocarcinoma s/p XRT 1152-8249 who presents today upon referral with for evaluation of abnormal CT Chest.     Francisco reports having stable shortness of breath with exertion and at night which he attributes to phlegm and he COPD.  He reports good control of his COPD and denies any recent hospitalizations in the past year.  With regards to his cancer follow-up, he reports being cleared by his oncology team 2 years after follow-up.  He continues to smoke cigarettes however he has cut down to half a pack a day whereas he previously smoked 3 packs daily.     Review of records shows that he had a PET-CT in 2018 that demonstrated a 2.1 cm spiculated nodule SUV 1.7 in the right lower lobe.  He has had a repeat CT chest performed at [facility] on 08/07/2023; report is available, however, imaging unavailable for personal review.      Past Medical History:   Diagnosis Date    Arthritis     Cancer     AdenoCarcinoma RLL : XRT  ~ 8841-4049 Felix    CHF (congestive heart failure)     Chronic pain     Coronary artery disease     Diabetes mellitus     Hypertension     Mixed hyperlipidemia        Past Surgical History:   Procedure Laterality Date    CARDIAC SURGERY      EYE SURGERY      hand fracture repair      NECK SURGERY N/A     ~ 2016 [place]    STOMACH SURGERY         Family History   Problem Relation Age of Onset    Heart disease Mother     Cancer Father     Heart disease Father         Social History     Socioeconomic History    Marital status:    Tobacco Use    Smoking status: Every Day     Current packs/day: 1.00     Average packs/day: 1 pack/day for 40.0  "years (40.0 ttl pk-yrs)     Types: Cigarettes     Start date: 8/29/1983    Smokeless tobacco: Never    Tobacco comments:     Trying to cut back    Substance and Sexual Activity    Alcohol use: Never    Drug use: Never    Sexual activity: Not Currently   Social History Narrative    S[employer] in [employer], [employer]       Social History     Social History Narrative    Served in [employer], [employer]       Review of patient's allergies indicates:   Allergen Reactions    Aloe vera latex gloves [gloves, latex with aloe vera] Rash    Latex Hives and Rash        Medications: Medications reviewed to include over the counter medications.    Review of Systems: A 10 point ROS was completed and found to be negative except for that mentioned above.      OBJECTIVE   PHYSICAL EXAM:  Vitals:    08/29/23 1529   BP: 132/64   BP Location: Left arm   Patient Position: Sitting   BP Method: Large (Automatic)   Pulse: 69   Resp: 18   SpO2: 97%   Weight: 84.8 kg (187 lb)   Height: 5' 7" (1.702 m)        GENERAL: NAD  HEENT: normocephalic, non-icteric conjunctivae, moist oral mucosa, nicotine staining of mustache  LYMPHATIC: no lymphadenopathy of neck  RESPIRATORY:  Faint expiratory wheezes and bibasilar lung fields, no wheezing, rales or rhonchi   CARDIOVASCULAR: Regular rate and rhythm, no murmurs rubs or gallops.   SKIN: no rash, jaundice, ecchymosis or ulcers  ABDOMINAL: soft  MUSCULOSKELETAL: No clubbing or cyanosis; no pedal edema, ambulates with a cane  NEUROLOGIC: AO ×3, no gross deficits  PSYCH: Normal mood and affect    LABS:  Care Everywhere image reports reviewed and notable for PET-CT with 2.1 cm spiculated nodule in right lower lobe.  Except from CT chest 0 8/07/2023 CT chest with difficult to measure irregular opacity within right lower lobe which measures approximately 3.4 cm; focal ground-glass opacity within the right middle lobe measuring up to 1.1 cm      IMAGING:  Lab studies reviewed and notable for " unremarkable CBC and modest creatinine elevation    LUNG FUNCTION TESTING: None available to review or report    ASSESSMENT & PLAN     1. Abnormal CT of the chest  Assessment & Plan:  63-year-old male with ongoing nicotine dependence and greater than 60 pack-years presents with abnormal CT chest from outside hospital.  CT chest from January 2023 reviewed and report from 08/20/2023 concerning for progression in disease.  Will request imaging to guide next steps in management given concern for malignancy.  Discussed with patient possible biopsy with which he is amenable with goal to pursue treatment.      2. Chronic bronchitis, unspecified chronic bronchitis type  Assessment & Plan:  Stable.  No recent exacerbation history.  Managed with Wixela b.i.d. and albuterol as needed.  [facility] agree with current management and continue chronic therapy with inhaled ICS-LABA and YOSEPH p.r.n.             Follow up in about 1 month (around 9/29/2023) for CT CHEST RESULTS.      Case was discussed with patient; all questions were answered to patient's satisfaction and patient verbalized understanding.     Cadence Lazcano MD  Pulmonary Medicine  [facility]  Phone: [phone]

## 2023-09-09 ENCOUNTER — HOSPITAL ENCOUNTER (EMERGENCY)
Facility: HOSPITAL | Age: 63
Discharge: HOME | End: 2023-09-09
Payer: COMMERCIAL

## 2023-09-09 VITALS
BODY MASS INDEX: 29.35 KG/M2 | OXYGEN SATURATION: 96 % | TEMPERATURE: 98 F | SYSTOLIC BLOOD PRESSURE: 114 MMHG | RESPIRATION RATE: 19 BRPM | WEIGHT: 187 LBS | HEART RATE: 66 BPM | DIASTOLIC BLOOD PRESSURE: 72 MMHG | HEIGHT: 67 IN

## 2023-09-09 DIAGNOSIS — J44.1 COPD EXACERBATION: Primary | ICD-10-CM

## 2023-09-09 DIAGNOSIS — R05.9 COUGH: ICD-10-CM

## 2023-09-09 LAB
FLUAV AG UPPER RESP QL IA.RAPID: NEGATIVE
FLUBV AG UPPER RESP QL IA.RAPID: NEGATIVE
SARS-COV+SARS-COV-2 AG RESP QL IA.RAPID: NEGATIVE

## 2023-09-09 PROCEDURE — 96372 THER/PROPH/DIAG INJ SC/IM: CPT | Performed by: NURSE PRACTITIONER

## 2023-09-09 PROCEDURE — 99284 EMERGENCY DEPT VISIT MOD MDM: CPT | Mod: 25

## 2023-09-09 PROCEDURE — 63600175 PHARM REV CODE 636 W HCPCS: Performed by: NURSE PRACTITIONER

## 2023-09-09 PROCEDURE — 87428 SARSCOV & INF VIR A&B AG IA: CPT | Performed by: NURSE PRACTITIONER

## 2023-09-09 PROCEDURE — 25000003 PHARM REV CODE 250: Performed by: NURSE PRACTITIONER

## 2023-09-09 PROCEDURE — 99284 PR EMERGENCY DEPT VISIT,LEVEL IV: ICD-10-PCS | Mod: ,,, | Performed by: NURSE PRACTITIONER

## 2023-09-09 PROCEDURE — 99284 EMERGENCY DEPT VISIT MOD MDM: CPT | Mod: ,,, | Performed by: NURSE PRACTITIONER

## 2023-09-09 RX ORDER — LIDOCAINE HYDROCHLORIDE 10 MG/ML
2.1 INJECTION INFILTRATION; PERINEURAL ONCE
Status: COMPLETED | OUTPATIENT
Start: 2023-09-09 | End: 2023-09-09

## 2023-09-09 RX ORDER — LIDOCAINE HYDROCHLORIDE 10 MG/ML
2.1 INJECTION INFILTRATION; PERINEURAL ONCE
Status: DISCONTINUED | OUTPATIENT
Start: 2023-09-09 | End: 2023-09-09

## 2023-09-09 RX ORDER — DEXAMETHASONE SODIUM PHOSPHATE 4 MG/ML
4 INJECTION, SOLUTION INTRA-ARTICULAR; INTRALESIONAL; INTRAMUSCULAR; INTRAVENOUS; SOFT TISSUE
Status: COMPLETED | OUTPATIENT
Start: 2023-09-09 | End: 2023-09-09

## 2023-09-09 RX ORDER — AZITHROMYCIN 250 MG/1
TABLET, FILM COATED ORAL
Qty: 6 TABLET | Refills: 0 | Status: SHIPPED | OUTPATIENT
Start: 2023-09-09 | End: 2023-09-14

## 2023-09-09 RX ORDER — CEFTRIAXONE 1 G/1
1 INJECTION, POWDER, FOR SOLUTION INTRAMUSCULAR; INTRAVENOUS ONCE
Status: DISCONTINUED | OUTPATIENT
Start: 2023-09-09 | End: 2023-09-09

## 2023-09-09 RX ORDER — AZITHROMYCIN 250 MG/1
500 TABLET, FILM COATED ORAL
Status: DISCONTINUED | OUTPATIENT
Start: 2023-09-09 | End: 2023-09-09

## 2023-09-09 RX ORDER — PREDNISONE 50 MG/1
50 TABLET ORAL DAILY
Qty: 5 TABLET | Refills: 0 | Status: SHIPPED | OUTPATIENT
Start: 2023-09-09 | End: 2023-09-14

## 2023-09-09 RX ORDER — HYDROCODONE BITARTRATE AND ACETAMINOPHEN 10; 325 MG/1; MG/1
1 TABLET ORAL EVERY 12 HOURS PRN
COMMUNITY

## 2023-09-09 RX ORDER — CEFTRIAXONE 1 G/1
1 INJECTION, POWDER, FOR SOLUTION INTRAMUSCULAR; INTRAVENOUS ONCE
Status: COMPLETED | OUTPATIENT
Start: 2023-09-09 | End: 2023-09-09

## 2023-09-09 RX ADMIN — CEFTRIAXONE SODIUM 1 G: 1 INJECTION, POWDER, FOR SOLUTION INTRAMUSCULAR; INTRAVENOUS at 04:09

## 2023-09-09 RX ADMIN — LIDOCAINE HYDROCHLORIDE 2.1 ML: 10 INJECTION, SOLUTION INFILTRATION; PERINEURAL at 04:09

## 2023-09-09 RX ADMIN — DEXAMETHASONE SODIUM PHOSPHATE 4 MG: 4 INJECTION, SOLUTION INTRA-ARTICULAR; INTRALESIONAL; INTRAMUSCULAR; INTRAVENOUS; SOFT TISSUE at 04:09

## 2023-09-09 NOTE — DISCHARGE INSTRUCTIONS
Continue using your nebulizer treatments at home. Avoid smoking. Take antibiotics and steroids as prescribed. [facility] Return to the ED for any increase in symptoms or any worrisome of symptoms.

## 2023-09-09 NOTE — ED PROVIDER NOTES
Encounter Date: 9/9/2023       History     Chief Complaint   Patient presents with    Hoarse     Yellow sinus drainage     Weakness     64 y/o Wm presents to the ED with complaints of nasal drainage, hoarseness, and nasal congestion for 1 day. He reports clear sputum. He has a HX of COPD and lung CA - undergone treatment [facility]. Pt denies any fever, chills, or covid exposure. He reports some wheezing but states no more than his normal. He uses nebs at home.       Review of patient's allergies indicates:   Allergen Reactions    Aloe vera latex gloves [gloves, latex with aloe vera] Rash    Latex Hives and Rash     Past Medical History:   Diagnosis Date    Arthritis     Cancer     AdenoCarcinoma RLL : XRT  ~ [id number] Felix    CHF (congestive heart failure)     Chronic pain     Coronary artery disease     Diabetes mellitus     Hypertension     Mixed hyperlipidemia      Past Surgical History:   Procedure Laterality Date    CARDIAC SURGERY      EYE SURGERY      hand fracture repair      NECK SURGERY N/A     ~ 2016 [place]    STOMACH SURGERY       Family History   Problem Relation Age of Onset    Heart disease Mother     Cancer Father     Heart disease Father      Social History     Tobacco Use    Smoking status: Every Day     Current packs/day: 0.50     Average packs/day: 1 pack/day for 40.0 years (39.9 ttl pk-yrs)     Types: Cigarettes     Start date: 8/29/1983    Smokeless tobacco: Never    Tobacco comments:     Trying to cut back    Substance Use Topics    Alcohol use: Never    Drug use: Never     Review of Systems   Constitutional: Negative.    HENT:  Positive for congestion.    Eyes: Negative.    Respiratory:  Positive for cough and wheezing. Negative for shortness of breath.    Cardiovascular: Negative.  Negative for chest pain, palpitations and leg swelling.   Gastrointestinal: Negative.  Negative for diarrhea, nausea and vomiting.   Endocrine: Negative.    Genitourinary: Negative.     Musculoskeletal: Negative.  Negative for arthralgias, back pain and neck pain.   Skin: Negative.    Neurological: Negative.    Psychiatric/Behavioral: Negative.     All other systems reviewed and are negative.      Physical Exam     Initial Vitals [09/09/23 1459]   BP Pulse Resp Temp SpO2   136/76 71 19 97.7 °F (36.5 °C) 96 %      MAP       --         Physical Exam    Nursing note and vitals reviewed.  Constitutional: He appears well-developed and well-nourished.   Eyes: Pupils are equal, round, and reactive to light.   Cardiovascular:  Normal rate and normal heart sounds.           Pulmonary/Chest: He has wheezes (scattered expiratory wheezes).   Abdominal: Abdomen is soft. Bowel sounds are normal. There is no abdominal tenderness. There is no rebound and no guarding.   Musculoskeletal:         General: Normal range of motion.     Neurological: He is alert and oriented to person, place, and time.   Skin: Skin is warm and dry.   Psychiatric: He has a normal mood and affect.         Medical Screening Exam   See Full Note    ED Course   Procedures  Labs Reviewed   SARS-COV2 (COVID) W/ FLU ANTIGEN          Imaging Results    None          Medications - No data to display  Medical Decision Making  62 y/o Wm presents to the ED with complaints of nasal drainage, hoarseness, and nasal congestion for 1 day. He reports clear sputum. He has a HX of COPD and lung CA - undergone treatment [facility]. Pt denies any fever, chills, or covid exposure. He reports some wheezing but states no more than his normal. He uses nebs at home.     Amount and/or Complexity of Data Reviewed  Labs: ordered.     Details: Covid and flu negative  Radiology: ordered.     Details: No change from previous study  Discussion of management or test interpretation with external provider(s): Will treat patient with antibiotics and steroids. He is to avoid smoking, take medication as prescribed, and follow up with his PCP. He is  stable for discharge.     Risk  Prescription drug management.      Additional MDM:   Differential Diagnosis:   Other: The following diagnoses were also considered and will be evaluated: flu, covid and bronchitis.                            Clinical Impression:   Final diagnoses:  [R05.9] Cough               Mehul Dai, ARNOLDO  09/09/23 1622       Mehul Dai, ARNOLDO  09/09/23 1629

## 2023-09-29 ENCOUNTER — OFFICE VISIT (OUTPATIENT)
Dept: PULMONOLOGY | Facility: CLINIC | Age: 63
End: 2023-09-29
Payer: OTHER GOVERNMENT

## 2023-09-29 VITALS
BODY MASS INDEX: 29.35 KG/M2 | OXYGEN SATURATION: 96 % | DIASTOLIC BLOOD PRESSURE: 69 MMHG | HEART RATE: 67 BPM | RESPIRATION RATE: 20 BRPM | SYSTOLIC BLOOD PRESSURE: 125 MMHG | WEIGHT: 187 LBS | HEIGHT: 67 IN

## 2023-09-29 DIAGNOSIS — J30.89 NON-SEASONAL ALLERGIC RHINITIS DUE TO OTHER ALLERGIC TRIGGER: ICD-10-CM

## 2023-09-29 DIAGNOSIS — R93.89 ABNORMAL CT OF THE CHEST: Primary | ICD-10-CM

## 2023-09-29 DIAGNOSIS — F17.200 NICOTINE DEPENDENCE WITH CURRENT USE: ICD-10-CM

## 2023-09-29 DIAGNOSIS — J44.9 CHRONIC OBSTRUCTIVE PULMONARY DISEASE, UNSPECIFIED COPD TYPE: ICD-10-CM

## 2023-09-29 DIAGNOSIS — J34.89 LESION OF NOSE: ICD-10-CM

## 2023-09-29 PROCEDURE — 99214 OFFICE O/P EST MOD 30 MIN: CPT | Mod: S$PBB,25,, | Performed by: STUDENT IN AN ORGANIZED HEALTH CARE EDUCATION/TRAINING PROGRAM

## 2023-09-29 PROCEDURE — 99214 PR OFFICE/OUTPT VISIT, EST, LEVL IV, 30-39 MIN: ICD-10-PCS | Mod: S$PBB,25,, | Performed by: STUDENT IN AN ORGANIZED HEALTH CARE EDUCATION/TRAINING PROGRAM

## 2023-09-29 PROCEDURE — 99215 OFFICE O/P EST HI 40 MIN: CPT | Mod: PBBFAC | Performed by: STUDENT IN AN ORGANIZED HEALTH CARE EDUCATION/TRAINING PROGRAM

## 2023-09-29 PROCEDURE — 99406 PR TOBACCO USE CESSATION INTERMEDIATE 3-10 MINUTES: ICD-10-PCS | Mod: S$PBB,,, | Performed by: STUDENT IN AN ORGANIZED HEALTH CARE EDUCATION/TRAINING PROGRAM

## 2023-09-29 PROCEDURE — 99406 BEHAV CHNG SMOKING 3-10 MIN: CPT | Mod: S$PBB,,, | Performed by: STUDENT IN AN ORGANIZED HEALTH CARE EDUCATION/TRAINING PROGRAM

## 2023-09-29 RX ORDER — FLUTICASONE PROPIONATE 50 MCG
1 SPRAY, SUSPENSION (ML) NASAL DAILY
Qty: 16 G | Refills: 6 | Status: SHIPPED | OUTPATIENT
Start: 2023-09-29

## 2023-09-29 RX ORDER — FLUTICASONE PROPIONATE 50 MCG
1 SPRAY, SUSPENSION (ML) NASAL DAILY
Qty: 16 G | Refills: 6 | Status: SHIPPED | OUTPATIENT
Start: 2023-09-29 | End: 2023-09-29

## 2023-09-29 NOTE — PATIENT INSTRUCTIONS
You should take your WIXELA inhaler 1 puff twice daily every day.       You have been started on a new mediation:  FLONASE nasal spray to be sprayed in each nostril once daily

## 2023-09-29 NOTE — ASSESSMENT & PLAN NOTE
Dangers of cigarette smoking were reviewed with patient in detail. Patient was Counseled for 3-10 minutes. Nicotine replacement options were discussed. Nicotine replacement was discussed- patient currently has nicotine lozenges at home and will begin use. Currently smoking 1/2 pack a day, goal for 1/4 ppd by next follow up visit.

## 2023-09-29 NOTE — ASSESSMENT & PLAN NOTE
Consideration of symptoms consistent with allergic rhinitis in this patient with ongoing nicotine dependence.  Plan for management with intranasal steroids.  Patient is a was instructed on proper use of intranasal spray device.  Will follow-up on improvement on next visit.

## 2023-09-29 NOTE — ASSESSMENT & PLAN NOTE
64 yo M with prior RLL adenocarcinoma and ongoing nicotine dependence >60 pack-years presents with abnormal CT chest from outside hospital with RLL platelite scar with surrounding bronchiectasis and RML part solid GGO 1 cm. Previously with post treatment PET in 2018 that demonstrated a 2.1 cm spiculated nodule SUV 1.7 in the right lower lobe. Plan for management as follows:  - will obtain PET CT for concern of RLL recurrence of malignancy   -- in event of increased uptake with significant SUV will consider biopsy  - will need active f/u of incidental RML part solid GGO; PET CT as above

## 2023-09-29 NOTE — ASSESSMENT & PLAN NOTE
States he has had this lesion for years and denies any bleeding or change.  No prior evaluation by a dermatologist.  Referral placed to Dermatology for evaluation.

## 2023-09-29 NOTE — PROGRESS NOTES
[facility]  Pulmonology  ESTABLISHED VISIT        Patient Name:  Jonny Singh  Primary Care Provider: No, Primary Doctor  Reason for Referral: Lung nodule  Date of Service: 09/29/2023      Chief Complaint: Abnormal chest scan    SUBJECTIVE   HPI:  Jonny Singh is a 63 y.o. male with COPD, CAD s/p CABG, hypertension, GERD, nicotine dependence and prior history of RLL adenocarcinoma s/p XRT [id number] who presents today for follow up evaluation. Last seen 08/29 with plan for obtaining OSH records CT Chest (completed).    Francisco reports feeling well on this evaluation.  He has complaints of bilateral knee pain and lower back pain. He had presented to the emergency department on 09/09 with complaints of increased nasal drainage and sneezing which he reports has been ongoing for the past month.  During this ED visit he was tested for COVID and rapid flu with of which were negative.  Chest x-ray was negative save for known right lower lobe platelike lesion (see imaging review below). He received treatment for COPD exacerbation with prednisone and azithromycin.  Today, he reports persistent nasal congestion symptoms and sneezing that did not improve with his treatment from the ED. He currently takes his Wixela on an as-needed basis.    Previous visit review of records shows that he had a PET-CT in 2018 that demonstrated a 2.1 cm spiculated nodule SUV 1.7 in the right lower lobe.      Past Medical History:   Diagnosis Date    Arthritis     Cancer     AdenoCarcinoma RLL : XRT  ~ 5869-3295 Felix    CHF (congestive heart failure)     Chronic pain     Coronary artery disease     Diabetes mellitus     Hypertension     Mixed hyperlipidemia        Past Surgical History:   Procedure Laterality Date    CARDIAC SURGERY      EYE SURGERY      hand fracture repair      NECK SURGERY N/A     ~ 2016 [place]    STOMACH SURGERY         Family History   Problem Relation Age of Onset    Heart disease Mother     Lung  "cancer Father     Heart disease Father         Social History     Socioeconomic History    Marital status:    Tobacco Use    Smoking status: Every Day     Current packs/day: 0.50     Average packs/day: 1 pack/day for 40.1 years (40.0 ttl pk-yrs)     Types: Cigarettes     Start date: 8/29/1983    Smokeless tobacco: Never    Tobacco comments:     Trying to cut back    Substance and Sexual Activity    Alcohol use: Never    Drug use: Never    Sexual activity: Not Currently   Social History Narrative    Served in [employer], [employer]       Social History     Social History Narrative    Served in [employer], [employer]       Review of patient's allergies indicates:   Allergen Reactions    Aloe vera latex gloves [gloves, latex with aloe vera] Rash    Latex Hives and Rash        Medications: Medications reviewed to include over the counter medications.    Review of Systems: A 10 point ROS was completed and found to be negative except for that mentioned above.      OBJECTIVE   PHYSICAL EXAM:  Vitals:    09/29/23 0921   BP: 125/69   BP Location: Left arm   Patient Position: Sitting   BP Method: Large (Automatic)   Pulse: 67   Resp: 20   SpO2: 96%   Weight: 84.8 kg (187 lb)   Height: 5' 7" (1.702 m)          GENERAL: NAD  HEENT: normocephalic, non-icteric conjunctivae, moist oral mucosa, nicotine staining of mustache  RESPIRATORY:  Expiratory wheeze and bibasilar lung fields, no rales or rhonchi   CARDIOVASCULAR: Regular rate and rhythm, no murmurs rubs or gallops.   SKIN: no rash, jaundice, ecchymosis or ulcers; pink raised lesion on nose with surrounding telangiectasia  MUSCULOSKELETAL: No clubbing or cyanosis; no pedal edema, ambulates with a cane  NEUROLOGIC: AO ×3, no gross deficits  PSYCH: Normal mood and affect    LABS: None new to report      IMAGING:  Lab studies reviewed and notable for CT chest 08/07/2023 RLL linear opacity with surrounding bronchiectasis 34.27mm, RML GGO with solid component " 11.35mm. CXR 09/09 with RLL linear opacity, no pneumothorax, no pleural effusion    LUNG FUNCTION TESTING: None available to review or report    ASSESSMENT & PLAN     1. Abnormal CT of the chest  Assessment & Plan:  62 yo M with prior RLL adenocarcinoma and ongoing nicotine dependence >60 pack-years presents with abnormal CT chest from outside hospital with RLL platelite scar with surrounding bronchiectasis and RML part solid GGO 1 cm. Previously with post treatment PET in 2018 that demonstrated a 2.1 cm spiculated nodule SUV 1.7 in the right lower lobe. Plan for management as follows:  - will obtain PET CT for concern of RLL recurrence of malignancy   -- in event of increased uptake with significant SUV will consider biopsy  - will need active f/u of incidental RML part solid GGO; PET CT as above    Orders:  -     NM PET CT Whole Body; Future; Expected date: 09/29/2023    2. Non-seasonal allergic rhinitis due to other allergic trigger  Assessment & Plan:  Consideration of symptoms consistent with allergic rhinitis in this patient with ongoing nicotine dependence.  Plan for management with intranasal steroids.  Patient is a was instructed on proper use of intranasal spray device.  Will follow-up on improvement on next visit.    Orders:  -     Discontinue: fluticasone propionate (FLONASE) 50 mcg/actuation nasal spray; 1 spray (50 mcg total) by Each Nostril route once daily.  Dispense: 16 g; Refill: 6  -     fluticasone propionate (FLONASE) 50 mcg/actuation nasal spray; 1 spray (50 mcg total) by Each Nostril route once daily.  Dispense: 16 g; Refill: 6    3. Lesion of nose  Assessment & Plan:  States he has had this lesion for years and denies any bleeding or change.  No prior evaluation by a dermatologist.  Referral placed to Dermatology for evaluation.     Orders:  -     Ambulatory referral/consult to Dermatology; Future; Expected date: 10/06/2023    4. Chronic obstructive pulmonary disease, unspecified COPD  type  Assessment & Plan:  [facility].  Patient with ongoing nicotine dependence and now presented to the emergency department with URI symptoms.  While no exacerbation history to date, we will complete workup with a repeat lung function testing for concern of progression of obstructive disease with ongoing nicotine dependence.  Discussed at length his inhaler regimen which is currently Wixela 1 puff that he takes as needed and albuterol as needed.  Plan for management as follows:  - Wixela 1 puff twice daily q.a.m. can q.p.m. patient to place it at bedside and use it every morning upon waking up and nightly before going to bed  - continue Richelle as needed  - PFTs ordered, follow-up    Orders:  -     Complete PFT w/ bronchodilator; Future    5. Nicotine dependence with current use  Assessment & Plan:  Dangers of cigarette smoking were reviewed with patient in detail. Patient was Counseled for 3-10 minutes. Nicotine replacement options were discussed. Nicotine replacement was discussed- patient currently has nicotine lozenges at home and will begin use. Currently smoking 1/2 pack a day, goal for 1/4 ppd by next follow up visit.     Orders:  -     Complete PFT w/ bronchodilator; Future             Follow up in about 4 weeks (around 10/27/2023) for EXAM RESULTS, PET SCAN.      Case was discussed with patient; all questions were answered to patient's satisfaction and patient verbalized understanding.     Cadence Lazcano MD  Pulmonary Medicine  [facility]  Phone: [phone]

## 2023-09-29 NOTE — ASSESSMENT & PLAN NOTE
Patient reports [facility] with [facility] over 5 years ago.  Patient with ongoing nicotine dependence and now presented to the emergency department with URI symptoms.  While no exacerbation history to date, we will complete workup with a repeat lung function testing for concern of progression of obstructive disease with ongoing nicotine dependence.  Discussed at length his inhaler regimen which is currently Wixela 1 puff that he takes as needed and albuterol as needed.  Plan for management as follows:  - Wixela 1 puff twice daily q.a.m. can q.p.m. patient to place it at bedside and use it every morning upon waking up and nightly before going to bed  - continue Richelle as needed  - PFTs ordered, follow-up

## 2023-10-30 ENCOUNTER — CLINICAL SUPPORT (OUTPATIENT)
Dept: PULMONOLOGY | Facility: HOSPITAL | Age: 63
End: 2023-10-30
Attending: STUDENT IN AN ORGANIZED HEALTH CARE EDUCATION/TRAINING PROGRAM
Payer: OTHER GOVERNMENT

## 2023-10-30 ENCOUNTER — OFFICE VISIT (OUTPATIENT)
Dept: PULMONOLOGY | Facility: CLINIC | Age: 63
End: 2023-10-30
Payer: OTHER GOVERNMENT

## 2023-10-30 VITALS
OXYGEN SATURATION: 93 % | BODY MASS INDEX: 29.35 KG/M2 | DIASTOLIC BLOOD PRESSURE: 80 MMHG | SYSTOLIC BLOOD PRESSURE: 144 MMHG | RESPIRATION RATE: 20 BRPM | HEIGHT: 67 IN | HEART RATE: 71 BPM | WEIGHT: 187 LBS

## 2023-10-30 DIAGNOSIS — J44.9 CHRONIC OBSTRUCTIVE PULMONARY DISEASE, UNSPECIFIED COPD TYPE: ICD-10-CM

## 2023-10-30 DIAGNOSIS — J44.9 CHRONIC OBSTRUCTIVE PULMONARY DISEASE, UNSPECIFIED COPD TYPE: Primary | ICD-10-CM

## 2023-10-30 DIAGNOSIS — F17.200 NICOTINE DEPENDENCE WITH CURRENT USE: ICD-10-CM

## 2023-10-30 DIAGNOSIS — Z85.118 HISTORY OF MALIGNANT NEOPLASM OF LUNG: Chronic | ICD-10-CM

## 2023-10-30 DIAGNOSIS — R93.89 ABNORMAL CT OF THE CHEST: ICD-10-CM

## 2023-10-30 PROCEDURE — 94726 PLETHYSMOGRAPHY LUNG VOLUMES: CPT | Mod: 26,,, | Performed by: INTERNAL MEDICINE

## 2023-10-30 PROCEDURE — 99214 PR OFFICE/OUTPT VISIT, EST, LEVL IV, 30-39 MIN: ICD-10-PCS | Mod: S$PBB,25,, | Performed by: INTERNAL MEDICINE

## 2023-10-30 PROCEDURE — 99215 OFFICE O/P EST HI 40 MIN: CPT | Mod: PBBFAC,25 | Performed by: INTERNAL MEDICINE

## 2023-10-30 PROCEDURE — 94726 PLETHYSMOGRAPHY LUNG VOLUMES: CPT

## 2023-10-30 PROCEDURE — 94729 DIFFUSING CAPACITY: CPT | Mod: 26,,, | Performed by: INTERNAL MEDICINE

## 2023-10-30 PROCEDURE — 27100098 HC SPACER

## 2023-10-30 PROCEDURE — 94726 PULM FUNCT TST PLETHYSMOGRAP: ICD-10-PCS | Mod: 26,,, | Performed by: INTERNAL MEDICINE

## 2023-10-30 PROCEDURE — 94729 PR C02/MEMBANE DIFFUSE CAPACITY: ICD-10-PCS | Mod: 26,,, | Performed by: INTERNAL MEDICINE

## 2023-10-30 PROCEDURE — 94060 PR EVAL OF BRONCHOSPASM: ICD-10-PCS | Mod: 26,,, | Performed by: INTERNAL MEDICINE

## 2023-10-30 PROCEDURE — 94729 DIFFUSING CAPACITY: CPT

## 2023-10-30 PROCEDURE — 94060 EVALUATION OF WHEEZING: CPT | Mod: 26,,, | Performed by: INTERNAL MEDICINE

## 2023-10-30 PROCEDURE — 99214 OFFICE O/P EST MOD 30 MIN: CPT | Mod: S$PBB,25,, | Performed by: INTERNAL MEDICINE

## 2023-10-30 PROCEDURE — 94060 EVALUATION OF WHEEZING: CPT

## 2023-10-30 NOTE — PROGRESS NOTES
Subjective:       Patient ID: Jonny Singh is a 63 y.o. male.    Chief Complaint: COPD (CT results and PFT results)    COPD  This is a chronic problem. The current episode started more than 1 month ago. The problem has been unchanged. Pertinent negatives include no abdominal pain, arthralgias, chest pain, chills, congestion, headaches or rash.     Past Medical History:   Diagnosis Date    Arthritis     Cancer     AdenoCarcinoma RLL : XRT  ~ 4886-7553 Felix    CHF (congestive heart failure)     Chronic pain     Coronary artery disease     Diabetes mellitus     Hypertension     Mixed hyperlipidemia      Past Surgical History:   Procedure Laterality Date    CARDIAC SURGERY      EYE SURGERY      hand fracture repair      NECK SURGERY N/A     ~ 2016 [place]    STOMACH SURGERY       Family History   Problem Relation Age of Onset    Heart disease Mother     Lung cancer Father     Heart disease Father      Review of patient's allergies indicates:   Allergen Reactions    Aloe vera latex gloves [gloves, latex with aloe vera] Rash    Latex Hives and Rash      Social History     Tobacco Use    Smoking status: Every Day     Current packs/day: 0.50     Average packs/day: 1 pack/day for 40.2 years (40.0 ttl pk-yrs)     Types: Cigarettes     Start date: 8/29/1983    Smokeless tobacco: Never    Tobacco comments:     Trying to cut back    Substance Use Topics    Alcohol use: Never    Drug use: Never      Review of Systems   Constitutional:  Negative for chills, activity change and night sweats.   HENT:  Negative for congestion and ear pain.    Eyes:  Negative for redness and itching.   Cardiovascular:  Negative for chest pain and palpitations.   Musculoskeletal:  Negative for arthralgias and back pain.   Skin:  Negative for rash.   Gastrointestinal:  Negative for abdominal pain and abdominal distention.   Neurological:  Negative for dizziness and headaches.   Hematological:  Negative for adenopathy. Does not bruise/bleed  "easily.   Psychiatric/Behavioral:  Negative for confusion. The patient is not nervous/anxious.        Objective:      Physical Exam   Constitutional: He is oriented to person, place, and time. He appears well-developed and well-nourished.   HENT:   Head: Normocephalic.   Nose: Nose normal.   Mouth/Throat: Oropharynx is clear and moist.   Neck: No JVD present. No thyromegaly present.   Cardiovascular: Normal rate, regular rhythm, normal heart sounds and intact distal pulses.   Pulmonary/Chest: Normal expansion, hyperinflation, symmetric chest wall expansion, effort normal and breath sounds normal.   Abdominal: Soft. Bowel sounds are normal.   Musculoskeletal:         General: Normal range of motion.      Cervical back: Normal range of motion and neck supple.   Lymphadenopathy: No supraclavicular adenopathy is present.     He has no cervical adenopathy.   Neurological: He is alert and oriented to person, place, and time. He has normal reflexes.   Skin: Skin is warm and dry.   Psychiatric: He has a normal mood and affect. His behavior is normal.     Personal Diagnostic Review  none pertinent        10/30/2023     2:26 PM 10/30/2023     1:45 PM 9/29/2023     9:21 AM 9/9/2023     4:31 PM 9/9/2023     4:17 PM 9/9/2023     2:59 PM 8/29/2023     3:29 PM   Pulmonary Function Tests   FVC  4.13 liters        FVC%  48        FEV1  3.21 liters        FEV1%  37        FEF 25-75  2.71        FEF 25-75%  18        TLC (liters)  6.38 liters        TLC%  95        RV  2.1        RV%  190        DLCO (ml/mmHg sec)  24.92 ml/mmHg sec        DLCO%  40        Peak Flow  508 L/min        SpO2 93 %  96 % 96 % 95 % 96 % 97 %   Height 5' 7" (1.702 m)  5' 7" (1.702 m)   5' 7" (1.702 m) 5' 7" (1.702 m)   Weight 84.8 kg (187 lb)  84.8 kg (187 lb)   84.8 kg (187 lb) 84.8 kg (187 lb)   BMI (Calculated) 29.3  29.3   29.3 29.3         Assessment:       1. Chronic obstructive pulmonary disease, unspecified COPD type    2. History of malignant " neoplasm of lung    3. Nicotine dependence with current use    4. Abnormal CT of the chest        Outpatient Encounter Medications as of 10/30/2023   Medication Sig Dispense Refill    albuterol (PROVENTIL) 2.5 mg /3 mL (0.083 %) nebulizer solution Take 2.5 mg by nebulization every 6 (six) hours as needed for Wheezing or Shortness of Breath. Using ~ 1 time/day recently:  does not uuse when doing well Rescue      albuterol (PROVENTIL/VENTOLIN HFA) 90 mcg/actuation inhaler Inhale 1 puff into the lungs once daily.      amLODIPine (NORVASC) 10 MG tablet Take 10 mg by mouth once daily.      aspirin (ECOTRIN) 81 MG EC tablet Take 81 mg by mouth once daily.      atorvastatin (LIPITOR) 80 MG tablet Take 80 mg by mouth once daily.      blood sugar diagnostic Strp Place 1 strip onto the skin once daily.      cyanocobalamin (VITAMIN B-12) 1000 MCG tablet 1,000 mcg.      fluticasone propionate (FLONASE) 50 mcg/actuation nasal spray 1 spray (50 mcg total) by Each Nostril route once daily. 16 g 6    fluticasone-salmeterol diskus inhaler 250-50 mcg Inhale 1 puff into the lungs 2 (two) times daily. Controller      HYDROcodone-acetaminophen (NORCO)  mg per tablet Take 1 tablet by mouth every 12 (twelve) hours as needed for Pain.      loratadine (CLARITIN) 10 mg tablet Take 1 tablet (10 mg total) by mouth nightly as needed for Allergies. 30 tablet 0    meloxicam (MOBIC) 15 MG tablet Take 0.5 tablets (7.5 mg total) by mouth daily as needed for Pain. 30 tablet 0    methocarbamoL (ROBAXIN) 500 MG Tab Take 1 tablet (500 mg total) by mouth 2 (two) times daily as needed (muscle spasm). 30 tablet 0    metoprolol tartrate (LOPRESSOR) 100 MG tablet Take 150 mg by mouth 2 (two) times daily. 100 mg tab: 1.5 tab BID      nitroGLYCERIN (NITROSTAT) 0.4 MG SL tablet Place 0.4 mg under the tongue 4 (four) times daily as needed.      omeprazole (PRILOSEC) 20 MG capsule Take 40 mg by mouth once daily.       No facility-administered encounter  medications on file as of 10/30/2023.     Orders Placed This Encounter   Procedures    X-Ray Chest PA And Lateral     Standing Status:   Future     Standing Expiration Date:   10/30/2024     Order Specific Question:   May the Radiologist modify the order per protocol to meet the clinical needs of the patient?     Answer:   Yes       Plan:       Problem List Items Addressed This Visit          Pulmonary    COPD (chronic obstructive pulmonary disease) - Primary (Chronic)     Currently stable today         Relevant Orders    X-Ray Chest PA And Lateral       Oncology    History of malignant neoplasm of lung (Chronic)     PET scan showed no active disease will just watch for now            Other    Nicotine dependence with current use     Working on smoking cessation         Abnormal CT of the chest     PET scan was unremarkable

## 2023-10-31 NOTE — PROCEDURES
Pulmonary function test  Forced vital capacity 2.40 L 58% predicted  FEV1 1.38 L 43% predicted   FEV1 ratio 57%   Small airways disease  Response to bronchodilators noted   Hyperinflation  Decreased DLCO moderate to severe obstructive ventilatory impairment with hyperinflation normal DLCO

## 2023-12-06 ENCOUNTER — HOSPITAL ENCOUNTER (EMERGENCY)
Facility: HOSPITAL | Age: 63
Discharge: HOME | End: 2023-12-06
Attending: EMERGENCY MEDICINE
Payer: OTHER GOVERNMENT

## 2023-12-06 VITALS
TEMPERATURE: 98 F | SYSTOLIC BLOOD PRESSURE: 143 MMHG | BODY MASS INDEX: 29.29 KG/M2 | HEART RATE: 72 BPM | OXYGEN SATURATION: 96 % | WEIGHT: 187 LBS | DIASTOLIC BLOOD PRESSURE: 95 MMHG | RESPIRATION RATE: 14 BRPM

## 2023-12-06 DIAGNOSIS — R53.1 WEAKNESS: ICD-10-CM

## 2023-12-06 LAB
ALBUMIN SERPL BCP-MCNC: 3.6 G/DL (ref 3.5–5)
ALBUMIN/GLOB SERPL: 1 {RATIO}
ALP SERPL-CCNC: 83 U/L (ref 45–115)
ALT SERPL W P-5'-P-CCNC: 15 U/L (ref 16–61)
ANION GAP SERPL CALCULATED.3IONS-SCNC: 8 MMOL/L (ref 7–16)
AST SERPL W P-5'-P-CCNC: 18 U/L (ref 15–37)
BACTERIA #/AREA URNS HPF: ABNORMAL /HPF
BASOPHILS # BLD AUTO: 0.13 K/UL (ref 0–0.2)
BASOPHILS NFR BLD AUTO: 1.3 % (ref 0–1)
BILIRUB SERPL-MCNC: 1.3 MG/DL (ref ?–1.2)
BILIRUB UR QL STRIP: NEGATIVE
BUN SERPL-MCNC: 15 MG/DL (ref 7–18)
BUN/CREAT SERPL: 12 (ref 6–20)
CALCIUM SERPL-MCNC: 10.1 MG/DL (ref 8.5–10.1)
CHLORIDE SERPL-SCNC: 102 MMOL/L (ref 98–107)
CLARITY UR: CLEAR
CO2 SERPL-SCNC: 33 MMOL/L (ref 21–32)
COLOR UR: YELLOW
CREAT SERPL-MCNC: 1.29 MG/DL (ref 0.7–1.3)
DIFFERENTIAL METHOD BLD: ABNORMAL
EGFR (NO RACE VARIABLE) (RUSH/TITUS): 63 ML/MIN/1.73M2
EOSINOPHIL # BLD AUTO: 0.2 K/UL (ref 0–0.5)
EOSINOPHIL NFR BLD AUTO: 2.1 % (ref 1–4)
ERYTHROCYTE [DISTWIDTH] IN BLOOD BY AUTOMATED COUNT: 13.3 % (ref 11.5–14.5)
GLOBULIN SER-MCNC: 3.5 G/DL (ref 2–4)
GLUCOSE SERPL-MCNC: 105 MG/DL (ref 74–106)
GLUCOSE UR STRIP-MCNC: NORMAL MG/DL
HCT VFR BLD AUTO: 44 % (ref 40–54)
HGB BLD-MCNC: 14.5 G/DL (ref 13.5–18)
IMM GRANULOCYTES # BLD AUTO: 0.02 K/UL (ref 0–0.04)
IMM GRANULOCYTES NFR BLD: 0.2 % (ref 0–0.4)
KETONES UR STRIP-SCNC: NEGATIVE MG/DL
LEUKOCYTE ESTERASE UR QL STRIP: NEGATIVE
LYMPHOCYTES # BLD AUTO: 2.82 K/UL (ref 1–4.8)
LYMPHOCYTES NFR BLD AUTO: 29.3 % (ref 27–41)
MAGNESIUM SERPL-MCNC: 1.7 MG/DL (ref 1.7–2.3)
MCH RBC QN AUTO: 29.8 PG (ref 27–31)
MCHC RBC AUTO-ENTMCNC: 33 G/DL (ref 32–36)
MCV RBC AUTO: 90.5 FL (ref 80–96)
MONOCYTES # BLD AUTO: 0.61 K/UL (ref 0–0.8)
MONOCYTES NFR BLD AUTO: 6.3 % (ref 2–6)
MPC BLD CALC-MCNC: 10.7 FL (ref 9.4–12.4)
MUCOUS, UA: ABNORMAL /LPF
NEUTROPHILS # BLD AUTO: 5.85 K/UL (ref 1.8–7.7)
NEUTROPHILS NFR BLD AUTO: 60.8 % (ref 53–65)
NITRITE UR QL STRIP: NEGATIVE
NRBC # BLD AUTO: 0 X10E3/UL
NRBC, AUTO (.00): 0 %
NT-PROBNP SERPL-MCNC: 559 PG/ML (ref 1–125)
PH UR STRIP: 6 PH UNITS
PLATELET # BLD AUTO: 272 K/UL (ref 150–400)
POTASSIUM SERPL-SCNC: 4.6 MMOL/L (ref 3.5–5.1)
PROT SERPL-MCNC: 7.1 G/DL (ref 6.4–8.2)
PROT UR QL STRIP: 30
RBC # BLD AUTO: 4.86 M/UL (ref 4.6–6.2)
RBC # UR STRIP: NEGATIVE /UL
RBC #/AREA URNS HPF: 5 /HPF
SODIUM SERPL-SCNC: 138 MMOL/L (ref 136–145)
SP GR UR STRIP: 1.03
SQUAMOUS #/AREA URNS LPF: ABNORMAL /HPF
TROPONIN I SERPL DL<=0.01 NG/ML-MCNC: 10.7 PG/ML
TROPONIN I SERPL DL<=0.01 NG/ML-MCNC: 12.6 PG/ML
UROBILINOGEN UR STRIP-ACNC: 4 MG/DL
WBC # BLD AUTO: 9.63 K/UL (ref 4.5–11)
WBC #/AREA URNS HPF: 4 /HPF

## 2023-12-06 PROCEDURE — 85025 COMPLETE CBC W/AUTO DIFF WBC: CPT | Performed by: EMERGENCY MEDICINE

## 2023-12-06 PROCEDURE — 83880 ASSAY OF NATRIURETIC PEPTIDE: CPT | Performed by: EMERGENCY MEDICINE

## 2023-12-06 PROCEDURE — 84484 ASSAY OF TROPONIN QUANT: CPT | Performed by: EMERGENCY MEDICINE

## 2023-12-06 PROCEDURE — 83735 ASSAY OF MAGNESIUM: CPT | Performed by: EMERGENCY MEDICINE

## 2023-12-06 PROCEDURE — 93010 ELECTROCARDIOGRAM REPORT: CPT | Mod: ,,, | Performed by: STUDENT IN AN ORGANIZED HEALTH CARE EDUCATION/TRAINING PROGRAM

## 2023-12-06 PROCEDURE — 99285 EMERGENCY DEPT VISIT HI MDM: CPT | Mod: 25

## 2023-12-06 PROCEDURE — 80053 COMPREHEN METABOLIC PANEL: CPT | Performed by: EMERGENCY MEDICINE

## 2023-12-06 PROCEDURE — 93005 ELECTROCARDIOGRAM TRACING: CPT

## 2023-12-06 PROCEDURE — 81003 URINALYSIS AUTO W/O SCOPE: CPT | Performed by: EMERGENCY MEDICINE

## 2023-12-06 PROCEDURE — 99284 EMERGENCY DEPT VISIT MOD MDM: CPT | Mod: ,,, | Performed by: EMERGENCY MEDICINE

## 2023-12-06 NOTE — ED TRIAGE NOTES
PATIENT PRESENTS TO ER WITH COMPLAINT OF BACK, NECK PAIN AND DIZZINESS.  REPORTS A FALL 4 DAYS AGO

## 2023-12-06 NOTE — ED NOTES
Orthostatic Vital Signs               Lying                         Sitting                      Standing  HR      69                              72                            77  BP      154/82                       149/86                     143/95  RR      13                              14                             18  O2      95 2L                          97  2L                      95 2L

## 2023-12-06 NOTE — ED PROVIDER NOTES
Encounter Date: 12/6/2023    SCRIBE #1 NOTE: I, Libby Christianson, am scribing for, and in the presence of,  Micheal Long MD. I have scribed the entire note.       History     Chief Complaint   Patient presents with    Dizziness     Patient is a 64 y/o male that presents to the ED with the complaints of Dizziness, Back and Neck pain. The patient stated he fell 4-5 days ago because he felt dizzy. Patient also still feels dizzy (weak and light headed). Patient has allergies to Aloe Vera Latex Gloves (gloves, latex with Aloe Vera), Latex. Patient has Medical History of COPD and at present is a smoker and surgical history of Cardiac Surgery, Eye surgery, Hand Fracture repart, Neck Surgery, Stomach surgery.There are no other issues to report with this patient at this time.    The history is provided by the patient. No  was used.     Review of patient's allergies indicates:   Allergen Reactions    Aloe vera latex gloves [gloves, latex with aloe vera] Rash    Latex Hives and Rash     Past Medical History:   Diagnosis Date    Arthritis     Cancer     AdenoCarcinoma RLL : XRT  ~ 2543-2852 Felix    CHF (congestive heart failure)     Chronic pain     Coronary artery disease     Diabetes mellitus     Hypertension     Mixed hyperlipidemia      Past Surgical History:   Procedure Laterality Date    CARDIAC SURGERY      EYE SURGERY      hand fracture repair      NECK SURGERY N/A     ~ 2016 [place]    STOMACH SURGERY       Family History   Problem Relation Age of Onset    Heart disease Mother     Lung cancer Father     Heart disease Father      Social History     Tobacco Use    Smoking status: Every Day     Current packs/day: 0.50     Average packs/day: 1 pack/day for 40.4 years (40.1 ttl pk-yrs)     Types: Cigarettes     Start date: 8/29/1983    Smokeless tobacco: Never    Tobacco comments:     Trying to cut back    Substance Use Topics    Alcohol use: Never    Drug use: Never     Review of Systems    Constitutional: Negative.    HENT:  Negative for hearing loss.         Neck pain   Eyes: Negative.    Respiratory:  Positive for wheezing.         Expiratory wheezing    Endocrine: Negative.    Genitourinary: Negative.    Allergic/Immunologic:        Amelia Vera Latex Gloves (gloves, Latex with Amelia vera) Latex   Neurological:  Positive for dizziness.   All other systems reviewed and are negative.      Physical Exam     Initial Vitals [12/06/23 1405]   BP Pulse Resp Temp SpO2   119/70 70 16 98.2 °F (36.8 °C) 97 %      MAP       --         Physical Exam    Nursing note and vitals reviewed.  Constitutional: He appears well-developed and well-nourished.   HENT:   Head: Normocephalic and atraumatic.   Neck:               ED Course   Procedures  Labs Reviewed   COMPREHENSIVE METABOLIC PANEL - Abnormal; Notable for the following components:       Result Value    CO2 33 (*)     Bilirubin, Total 1.3 (*)     ALT 15 (*)     All other components within normal limits   NT-PRO NATRIURETIC PEPTIDE - Abnormal; Notable for the following components:    ProBNP 559 (*)     All other components within normal limits   URINALYSIS, REFLEX TO URINE CULTURE - Abnormal; Notable for the following components:    Protein, UA 30 (*)     Urobilinogen, UA 4 (*)     Specific Gravity, UA 1.035 (*)     All other components within normal limits   CBC WITH DIFFERENTIAL - Abnormal; Notable for the following components:    Monocytes % 6.3 (*)     Basophils % 1.3 (*)     All other components within normal limits   URINALYSIS, MICROSCOPIC - Abnormal; Notable for the following components:    RBC, UA 5 (*)     Bacteria, UA Occasional (*)     Squamous Epithelial Cells, UA Occasional (*)     Mucous Occasional (*)     All other components within normal limits   TROPONIN I - Normal   MAGNESIUM - Normal   TROPONIN I - Normal   CBC W/ AUTO DIFFERENTIAL    Narrative:     The following orders were created for panel order CBC auto differential.  Procedure                                Abnormality         Status                     ---------                               -----------         ------                     CBC with Differential[[id number]]        Abnormal            Final result                 Please view results for these tests on the individual orders.        ECG Results              EKG 12-lead (Final result)  Result time 12/18/23 13:13:19      Final result by Interface, Lab In [facility] (12/18/23 13:13:19)                   Narrative:    Test Reason : R53.1,    Vent. Rate : 068 BPM     Atrial Rate : 000 BPM     P-R Int : 142 ms          QRS Dur : 100 ms      QT Int : 402 ms       P-R-T Axes : 074 078 058 degrees     QTc Int : 416 ms    Sinus rhythm with PVC(s)  Borderline ECG    Confirmed by Chad Paredes DO ([id number]) on 12/18/2023 1:13:09 PM    Referred By: BILLERR   SELF           Confirmed By:Chad Paredes DO                                  Imaging Results              X-Ray Chest AP Portable (Final result)  Result time 12/06/23 14:43:40      Final result by Stewart Hanna DO (12/06/23 14:43:40)                   Impression:      Focal airspace opacity located within the lateral right lower lobe.  Refer to the CT of the chest performed 08/07/2023.      Electronically signed by: Stewart Hanna  Date:    12/06/2023  Time:    14:43               Narrative:    EXAMINATION:  XR CHEST AP PORTABLE    CLINICAL HISTORY:  Weakness    TECHNIQUE:  XR CHEST AP PORTABLE    COMPARISON:  9/9/23    FINDINGS:  No lines or tubes.    Focal airspace opacity located within the lateral right lower lobe    Normal pleura.    Cardiac silhouette is similar to comparison exam.    No obvious acute bone findings.                                       CT Cervical Spine Without Contrast (Final result)  Result time 12/06/23 14:46:33      Final result by Stewart Hanna DO (12/06/23 14:46:33)                   Impression:      No acute traumatic injury to the cervical  spine.      Electronically signed by: Stewart Hanna  Date:    12/06/2023  Time:    14:46               Narrative:    EXAMINATION:  CT CERVICAL SPINE WITHOUT CONTRAST    CLINICAL HISTORY:  Neck trauma, midline tenderness (Age 16-64y);    TECHNIQUE:  Multiplanar CT of the cervical spine without contrast.    Dose reduction:    The CT exam was performed using one or more dose reduction techniques: Automatic exposure control, automated adjustment of the MA and/or kVP according to patient size, or use of iterative reconstruction technique.    COMPARISON:  None.    FINDINGS:  Severe degenerative disc disease C4-C5.  Postoperative change at C4-C5.    Moderate to severe degenerative disc disease C5-C6.  Mild multilevel degenerative change of the cervical spine otherwise.    The vertebral height and alignment is normal.    There is no evidence for acute fracture or subluxation.    No prevertebral soft tissue swelling is suggested.    The atlantoaxial and atlantooccipital articulations are normal.    Skull base appears unremarkable.    The lung apices are clear.    The thyroid gland appears normal.                                       CT Head Without Contrast (Final result)  Result time 12/06/23 14:42:48      Final result by Stewart Hanna DO (12/06/23 14:42:48)                   Impression:      No acute intracranial findings.      Electronically signed by: Stewart Hanna  Date:    12/06/2023  Time:    14:42               Narrative:    EXAMINATION:  CT HEAD WITHOUT CONTRAST    CLINICAL HISTORY:  Head trauma, moderate-severe    TECHNIQUE:  Multiplanar CT imaging from the vertex to skull the skull base was performed without contrast.    Dose reduction:    The CT exam was performed using one or more dose reduction techniques: Automatic exposure control, automated adjustment of the MA and/or kVP according to patient size, or use of iterative reconstruction technique.    COMPARISON:  None    FINDINGS:  Gray-white white  differentiation is normal.    Mild chronic small vessel ischemic change.    There is no CT evidence of acute intracranial hemorrhage or large territorial infarct. No epidural/subdural hematomas.    There is no evidence of hydrocephalus, midline shift or mass effect.    Scalp, paranasal sinuses, and mastoid air cells are normal.                                      Medications - No data to display  Medical Decision Making  DIZZINESS, BACK AND NECK PAIN AFTER FALL    DDX:  BACK / NECK STRAIN VS OTHER INJURY +/- REASON FOR DIZZINESS / WEAKNESS / FALL...  METABOLIC VS INFECTIOUS VS CARDIAC VS OTHER    OUTPATIENT FOLLOW UP     Amount and/or Complexity of Data Reviewed  Labs: ordered. Decision-making details documented in ED Course.  Radiology: ordered. Decision-making details documented in ED Course.              Attending Attestation:           Physician Attestation for Scribe:  Physician Attestation Statement for Scribe #1: I, Micheal Long MD, reviewed documentation, as scribed by Libby Christianson in my presence, and it is both accurate and complete.             ED Course as of 01/09/24 1010   Wed Dec 06, 2023   1503 12/06/23 1448  CT Cervical Spine Without Contrast  Performed: 12/06/23 1440  Final         Impression: No acute traumatic injury to the cervical spine. Electronically signed by: Stewart Hanna Date: 12/06/2023 Time: 14:46    12/06/23 1446  X-Ray Chest AP Portable  Performed: 12/06/23 1440  Final         Impression: Focal airspace opacity located within the lateral right lower lobe. Refer to the CT of the chest performed 08/07/2023. Electronically signed by: Stewart Hanna Date: 12/06/2023 Time: 14:43    12/06/23 1445  CT Head Without Contrast  Performed: 12/06/23 1438  Final         Impression: No acute intracranial findings. Electronically signed by: Stewart Hanna Date: 12/06/2023 Time: 14:42              [CM]   1504 CT Cervical Spine Without Contrast [CM]   1504 X-Ray Chest AP Portable [CM]    1504 CT Head Without Contrast [CM]      ED Course User Index  [CM] Libby Christianson                           Clinical Impression:  Final diagnoses:  [R53.1] Weakness          ED Disposition Condition    Discharge Stable          ED Prescriptions    None       Follow-up Information       Follow up With Specialties Details Why Contact Info    PRIMARY CARE PROVIDER   As needed              Micheal Long MD  01/09/24 101

## 2024-03-02 ENCOUNTER — HOSPITAL ENCOUNTER (EMERGENCY)
Facility: HOSPITAL | Age: 64
Discharge: HOME | End: 2024-03-02
Payer: OTHER GOVERNMENT

## 2024-03-02 VITALS
HEART RATE: 75 BPM | RESPIRATION RATE: 17 BRPM | DIASTOLIC BLOOD PRESSURE: 70 MMHG | HEIGHT: 67 IN | WEIGHT: 185 LBS | OXYGEN SATURATION: 96 % | SYSTOLIC BLOOD PRESSURE: 133 MMHG | TEMPERATURE: 98 F | BODY MASS INDEX: 29.03 KG/M2

## 2024-03-02 DIAGNOSIS — G89.4 CHRONIC PAIN SYNDROME: Primary | ICD-10-CM

## 2024-03-02 PROCEDURE — 63600175 PHARM REV CODE 636 W HCPCS: Performed by: NURSE PRACTITIONER

## 2024-03-02 PROCEDURE — 99284 EMERGENCY DEPT VISIT MOD MDM: CPT | Mod: ,,, | Performed by: NURSE PRACTITIONER

## 2024-03-02 PROCEDURE — 96372 THER/PROPH/DIAG INJ SC/IM: CPT | Performed by: NURSE PRACTITIONER

## 2024-03-02 PROCEDURE — 99284 EMERGENCY DEPT VISIT MOD MDM: CPT | Mod: 25

## 2024-03-02 RX ORDER — KETOROLAC TROMETHAMINE 30 MG/ML
30 INJECTION, SOLUTION INTRAMUSCULAR; INTRAVENOUS
Status: COMPLETED | OUTPATIENT
Start: 2024-03-02 | End: 2024-03-02

## 2024-03-02 RX ADMIN — KETOROLAC TROMETHAMINE 30 MG: 30 INJECTION, SOLUTION INTRAMUSCULAR at 07:03

## 2024-03-03 NOTE — ED PROVIDER NOTES
Encounter Date: 3/2/2024       History     Chief Complaint   Patient presents with    Chronic Pain     64 y/o male presents to the emergency department for chronic neck pain.   has chronic neck pain for several years.  Is established patient at local pain treatment provider with rx for norco, but [facility] has not obtained it form pharmacy lately.  Denies any acute changes in symptoms.      Review of patient's allergies indicates:   Allergen Reactions    Aloe vera latex gloves [gloves, latex with aloe vera] Rash    Latex Hives and Rash     Past Medical History:   Diagnosis Date    Arthritis     Cancer     AdenoCarcinoma RLL : XRT  ~ 6343-9928 Felix    CHF (congestive heart failure)     Chronic pain     Coronary artery disease     Diabetes mellitus     Hypertension     Mixed hyperlipidemia      Past Surgical History:   Procedure Laterality Date    CARDIAC SURGERY      EYE SURGERY      hand fracture repair      NECK SURGERY N/A     ~ 2016 [place]    STOMACH SURGERY       Family History   Problem Relation Age of Onset    Heart disease Mother     Lung cancer Father     Heart disease Father      Social History     Tobacco Use    Smoking status: Every Day     Current packs/day: 0.50     Average packs/day: 1 pack/day for 40.5 years (40.2 ttl pk-yrs)     Types: Cigarettes     Start date: 8/29/1983    Smokeless tobacco: Never    Tobacco comments:     Trying to cut back    Substance Use Topics    Alcohol use: Never    Drug use: Never     Review of Systems   Constitutional:  Negative for fever.   HENT:  Negative for sore throat.    Respiratory:  Negative for shortness of breath.    Cardiovascular:  Negative for chest pain.   Gastrointestinal:  Negative for nausea.   Genitourinary:  Negative for dysuria.   Musculoskeletal:  Negative for back pain.   Skin:  Negative for rash.   Neurological:  Negative for weakness.   Hematological:  Does not bruise/bleed easily.   All other systems reviewed and are negative.      Physical  Exam     Initial Vitals [03/02/24 1845]   BP Pulse Resp Temp SpO2   131/68 74 18 97.8 °F (36.6 °C) 95 %      MAP       --         Physical Exam    Constitutional: He appears well-developed and well-nourished.   HENT:   Head: Normocephalic.   Eyes: EOM are normal. Pupils are equal, round, and reactive to light.   Neck: Neck supple.   Cardiovascular:  Normal rate, regular rhythm, normal heart sounds and intact distal pulses.           Pulmonary/Chest: Breath sounds normal.   Abdominal: Abdomen is soft. Bowel sounds are normal.   Musculoskeletal:         General: Normal range of motion.      Cervical back: Neck supple.     Neurological: He is alert and oriented to person, place, and time. He has normal strength. No cranial nerve deficit or sensory deficit. GCS score is 15. GCS eye subscore is 4. GCS verbal subscore is 5. GCS motor subscore is 6.   Skin: Skin is warm and dry. Capillary refill takes less than 2 seconds.   Psychiatric: He has a normal mood and affect. His behavior is normal. Thought content normal.         Medical Screening Exam   See Full Note    ED Course   Procedures  Labs Reviewed - No data to display       Imaging Results    None          Medications   ketorolac injection 30 mg (30 mg Intramuscular Given 3/2/24 1914)     Medical Decision Making  62 y/o male presents to the emergency department for chronic neck pain.  [facility] [place] [facility] pain for several years.  Is established patient at local pain treatment provider with rx for norco, but [facility] has not obtained it form pharmacy lately.  Denies any acute changes in symptoms.    Amount and/or Complexity of Data Reviewed  Discussion of management or test interpretation with external provider(s): No indication for imaging, not acute, has rx for opiate pain medication, given toradol injection here in clinic    Risk  Prescription drug management.                                      Clinical Impression:   Final diagnoses:  [G89.4] Chronic pain  syndrome (Primary)        ED Disposition Condition    Discharge Stable          ED Prescriptions    None       Follow-up Information    None          Aren Parnell, ARNOLDO  03/04/24 0751

## 2024-03-03 NOTE — ED TRIAGE NOTES
PATIENT PRESENTS TO ER WITH COMPLAINT OF CHRONIC NECK, BACK PAIN AND SWELLING. REPORTS ONGOING FOR OVER A YEAR

## 2024-03-23 ENCOUNTER — HOSPITAL ENCOUNTER (EMERGENCY)
Facility: HOSPITAL | Age: 64
Discharge: HOME | End: 2024-03-23
Attending: FAMILY MEDICINE
Payer: COMMERCIAL

## 2024-03-23 VITALS
HEART RATE: 87 BPM | SYSTOLIC BLOOD PRESSURE: 141 MMHG | OXYGEN SATURATION: 96 % | BODY MASS INDEX: 27.94 KG/M2 | TEMPERATURE: 99 F | WEIGHT: 178 LBS | DIASTOLIC BLOOD PRESSURE: 71 MMHG | RESPIRATION RATE: 19 BRPM | HEIGHT: 67 IN

## 2024-03-23 DIAGNOSIS — M25.551 RIGHT HIP PAIN: ICD-10-CM

## 2024-03-23 DIAGNOSIS — M54.9 BACK PAIN, UNSPECIFIED BACK LOCATION, UNSPECIFIED BACK PAIN LATERALITY, UNSPECIFIED CHRONICITY: Primary | ICD-10-CM

## 2024-03-23 DIAGNOSIS — R06.02 SOB (SHORTNESS OF BREATH): ICD-10-CM

## 2024-03-23 LAB
ALBUMIN SERPL BCP-MCNC: 3.6 G/DL (ref 3.5–5)
ALBUMIN/GLOB SERPL: 1.2 {RATIO}
ALP SERPL-CCNC: 69 U/L (ref 45–115)
ALT SERPL W P-5'-P-CCNC: 10 U/L (ref 16–61)
ANION GAP SERPL CALCULATED.3IONS-SCNC: 9 MMOL/L (ref 7–16)
AST SERPL W P-5'-P-CCNC: 18 U/L (ref 15–37)
BASOPHILS # BLD AUTO: 0.11 K/UL (ref 0–0.2)
BASOPHILS NFR BLD AUTO: 1.3 % (ref 0–1)
BILIRUB SERPL-MCNC: 1.1 MG/DL (ref ?–1.2)
BUN SERPL-MCNC: 17 MG/DL (ref 7–18)
BUN/CREAT SERPL: 11 (ref 6–20)
CALCIUM SERPL-MCNC: 9.7 MG/DL (ref 8.5–10.1)
CHLORIDE SERPL-SCNC: 105 MMOL/L (ref 98–107)
CO2 SERPL-SCNC: 29 MMOL/L (ref 21–32)
CREAT SERPL-MCNC: 1.54 MG/DL (ref 0.7–1.3)
DIFFERENTIAL METHOD BLD: ABNORMAL
EGFR (NO RACE VARIABLE) (RUSH/TITUS): 51 ML/MIN/1.73M2
EOSINOPHIL # BLD AUTO: 0.09 K/UL (ref 0–0.5)
EOSINOPHIL NFR BLD AUTO: 1.1 % (ref 1–4)
ERYTHROCYTE [DISTWIDTH] IN BLOOD BY AUTOMATED COUNT: 12.9 % (ref 11.5–14.5)
GLOBULIN SER-MCNC: 3 G/DL (ref 2–4)
GLUCOSE SERPL-MCNC: 98 MG/DL (ref 74–106)
HCT VFR BLD AUTO: 38.7 % (ref 40–54)
HGB BLD-MCNC: 12.9 G/DL (ref 13.5–18)
IMM GRANULOCYTES # BLD AUTO: 0.04 K/UL (ref 0–0.04)
IMM GRANULOCYTES NFR BLD: 0.5 % (ref 0–0.4)
LYMPHOCYTES # BLD AUTO: 0.66 K/UL (ref 1–4.8)
LYMPHOCYTES NFR BLD AUTO: 8 % (ref 27–41)
MCH RBC QN AUTO: 29.8 PG (ref 27–31)
MCHC RBC AUTO-ENTMCNC: 33.3 G/DL (ref 32–36)
MCV RBC AUTO: 89.4 FL (ref 80–96)
MONOCYTES # BLD AUTO: 0.98 K/UL (ref 0–0.8)
MONOCYTES NFR BLD AUTO: 11.9 % (ref 2–6)
MPC BLD CALC-MCNC: 11.3 FL (ref 9.4–12.4)
NEUTROPHILS # BLD AUTO: 6.37 K/UL (ref 1.8–7.7)
NEUTROPHILS NFR BLD AUTO: 77.2 % (ref 53–65)
NRBC # BLD AUTO: 0 X10E3/UL
NRBC, AUTO (.00): 0 %
PLATELET # BLD AUTO: 209 K/UL (ref 150–400)
POTASSIUM SERPL-SCNC: 4.2 MMOL/L (ref 3.5–5.1)
PROT SERPL-MCNC: 6.6 G/DL (ref 6.4–8.2)
RBC # BLD AUTO: 4.33 M/UL (ref 4.6–6.2)
SODIUM SERPL-SCNC: 139 MMOL/L (ref 136–145)
TROPONIN I SERPL DL<=0.01 NG/ML-MCNC: 12.8 PG/ML
WBC # BLD AUTO: 8.25 K/UL (ref 4.5–11)

## 2024-03-23 PROCEDURE — 84484 ASSAY OF TROPONIN QUANT: CPT | Performed by: FAMILY MEDICINE

## 2024-03-23 PROCEDURE — 25000003 PHARM REV CODE 250: Performed by: FAMILY MEDICINE

## 2024-03-23 PROCEDURE — 93005 ELECTROCARDIOGRAM TRACING: CPT

## 2024-03-23 PROCEDURE — 93010 ELECTROCARDIOGRAM REPORT: CPT | Mod: ,,, | Performed by: INTERNAL MEDICINE

## 2024-03-23 PROCEDURE — 85025 COMPLETE CBC W/AUTO DIFF WBC: CPT | Performed by: FAMILY MEDICINE

## 2024-03-23 PROCEDURE — 99284 EMERGENCY DEPT VISIT MOD MDM: CPT | Mod: ,,, | Performed by: FAMILY MEDICINE

## 2024-03-23 PROCEDURE — 80053 COMPREHEN METABOLIC PANEL: CPT | Performed by: FAMILY MEDICINE

## 2024-03-23 PROCEDURE — 96374 THER/PROPH/DIAG INJ IV PUSH: CPT

## 2024-03-23 PROCEDURE — 63600175 PHARM REV CODE 636 W HCPCS: Performed by: FAMILY MEDICINE

## 2024-03-23 PROCEDURE — 99285 EMERGENCY DEPT VISIT HI MDM: CPT | Mod: 25

## 2024-03-23 PROCEDURE — 96372 THER/PROPH/DIAG INJ SC/IM: CPT | Mod: 59 | Performed by: FAMILY MEDICINE

## 2024-03-23 RX ORDER — ONDANSETRON 4 MG/1
4 TABLET, ORALLY DISINTEGRATING ORAL
Status: COMPLETED | OUTPATIENT
Start: 2024-03-23 | End: 2024-03-23

## 2024-03-23 RX ORDER — HYDROMORPHONE HYDROCHLORIDE 2 MG/ML
1 INJECTION, SOLUTION INTRAMUSCULAR; INTRAVENOUS; SUBCUTANEOUS
Status: DISCONTINUED | OUTPATIENT
Start: 2024-03-23 | End: 2024-03-24 | Stop reason: HOSPADM

## 2024-03-23 RX ORDER — TIZANIDINE 4 MG/1
4 TABLET ORAL EVERY 6 HOURS PRN
Qty: 30 TABLET | Refills: 1 | Status: SHIPPED | OUTPATIENT
Start: 2024-03-23

## 2024-03-23 RX ORDER — KETOROLAC TROMETHAMINE 30 MG/ML
30 INJECTION, SOLUTION INTRAMUSCULAR; INTRAVENOUS
Status: COMPLETED | OUTPATIENT
Start: 2024-03-23 | End: 2024-03-23

## 2024-03-23 RX ORDER — DEXAMETHASONE SODIUM PHOSPHATE 4 MG/ML
4 INJECTION, SOLUTION INTRA-ARTICULAR; INTRALESIONAL; INTRAMUSCULAR; INTRAVENOUS; SOFT TISSUE
Status: COMPLETED | OUTPATIENT
Start: 2024-03-23 | End: 2024-03-23

## 2024-03-23 RX ADMIN — KETOROLAC TROMETHAMINE 30 MG: 30 INJECTION, SOLUTION INTRAMUSCULAR; INTRAVENOUS at 09:03

## 2024-03-23 RX ADMIN — DEXAMETHASONE SODIUM PHOSPHATE 4 MG: 4 INJECTION, SOLUTION INTRA-ARTICULAR; INTRALESIONAL; INTRAMUSCULAR; INTRAVENOUS; SOFT TISSUE at 09:03

## 2024-03-23 RX ADMIN — ONDANSETRON 4 MG: 4 TABLET, ORALLY DISINTEGRATING ORAL at 09:03

## 2024-03-24 LAB
OHS QRS DURATION: 96 MS
OHS QTC CALCULATION: 385 MS

## 2024-03-24 NOTE — ED PROVIDER NOTES
Encounter Date: 3/23/2024    SCRIBE #1 NOTE: I, Verna Harris, am scribing for, and in the presence of,  Natanael Alas DO. I have scribed the entire note.       History     Chief Complaint   Patient presents with    Shortness of Breath    Back Pain    Leg Pain     This is a 64 y/o male,who presents to the ED with complaints of right hip pain which radiates down the right side of his back. There is no Hx of any recent falls or traumas. He notes he takes Norco 10. There is no Hx of nausea or vomiting. There are no other complaints/pain in the ED at this time. He has a known hx of CAD, HTN, hyperlipidemia, cancer, diabetes, and CHF. He has had stomach surgery, neck surgery, hand surgery, eye surgery and cardiac surgery. He is a current every day smoker.     The history is provided by the patient. No  was used.     Review of patient's allergies indicates:   Allergen Reactions    Aloe vera latex gloves [gloves, latex with aloe vera] Rash    Latex Hives and Rash     Past Medical History:   Diagnosis Date    Arthritis     Cancer     AdenoCarcinoma RLL : XRT  ~ 7482-7207 Felix    CHF (congestive heart failure)     Chronic pain     Coronary artery disease     Diabetes mellitus     Hypertension     Mixed hyperlipidemia      Past Surgical History:   Procedure Laterality Date    CARDIAC SURGERY      EYE SURGERY      hand fracture repair      NECK SURGERY N/A     ~ 2016 [place]    STOMACH SURGERY       Family History   Problem Relation Age of Onset    Heart disease Mother     Lung cancer Father     Heart disease Father      Social History     Tobacco Use    Smoking status: Every Day     Current packs/day: 0.50     Average packs/day: 1 pack/day for 40.6 years (40.2 ttl pk-yrs)     Types: Cigarettes     Start date: 8/29/1983    Smokeless tobacco: Never    Tobacco comments:     Trying to cut back    Substance Use Topics    Alcohol use: Never    Drug use: Never     Review of Systems   Musculoskeletal:          Right hip pain which radiates down the right side of the back.      All other systems reviewed and are negative.      Physical Exam     Initial Vitals   BP Pulse Resp Temp SpO2   03/23/24 1938 03/23/24 1938 03/23/24 1939 03/23/24 1939 03/23/24 1939   135/72 90 (!) 21 98.5 °F (36.9 °C) (!) 91 %      MAP       --                Physical Exam    Nursing note and vitals reviewed.  Constitutional: He appears well-developed and well-nourished.   HENT:   Head: Normocephalic and atraumatic.   Eyes: Conjunctivae and EOM are normal. Pupils are equal, round, and reactive to light.   Neck: Neck supple.   Normal range of motion.  Musculoskeletal:         General: No tenderness. Normal range of motion.      Cervical back: Normal range of motion and neck supple.     Neurological: He is alert and oriented to person, place, and time.   Skin: Skin is warm and dry.   Psychiatric: He has a normal mood and affect.         ED Course   Procedures  Labs Reviewed   COMPREHENSIVE METABOLIC PANEL - Abnormal; Notable for the following components:       Result Value    Creatinine 1.54 (*)     ALT 10 (*)     eGFR 51 (*)     All other components within normal limits   CBC WITH DIFFERENTIAL - Abnormal; Notable for the following components:    RBC 4.33 (*)     Hemoglobin 12.9 (*)     Hematocrit 38.7 (*)     Neutrophils % 77.2 (*)     Lymphocytes % 8.0 (*)     Monocytes % 11.9 (*)     Basophils % 1.3 (*)     Immature Granulocytes % 0.5 (*)     Lymphocytes, Absolute 0.66 (*)     Monocytes, Absolute 0.98 (*)     All other components within normal limits   TROPONIN I - Normal   CBC W/ AUTO DIFFERENTIAL    Narrative:     The following orders were created for panel order CBC auto differential.  Procedure                               Abnormality         Status                     ---------                               -----------         ------                     CBC with Differential[[id number]]       Abnormal            Final result                  Please view results for these tests on the individual orders.          Imaging Results              X-Ray Hip 2 or 3 views Right (with Pelvis when performed) (In process)                      X-Ray Chest AP (Final result)  Result time 03/23/24 20:09:41      Final result by Aaron Meza DO (03/23/24 20:09:41)                   Impression:      No acute cardiopulmonary process demonstrated.  Similar irregular opacity within the right lower lung. Consider PET-CT.    Point of Service: [facility]      Electronically signed by: Aaron Meza  Date:    03/23/2024  Time:    20:09               Narrative:    EXAMINATION:  XR CHEST AP PORTABLE    CLINICAL HISTORY:  sob;    COMPARISON:  Chest x-ray December 6, 2023    TECHNIQUE:  Frontal view/views of the chest.    FINDINGS:  The cardiomediastinal silhouette is stable in configuration.  Prior sternotomy.  Chronic change of the lungs without focal consolidation, pleural effusion, or pneumothorax.  Similar irregular opacity within the right lower lung.  Consider PET-CT.  Visualized osseous and surrounding soft tissue structures appear grossly unchanged.                                       Medications   HYDROmorphone (PF) injection 1 mg (1 mg Intramuscular Not Given 3/23/24 2100)   ketorolac injection 30 mg (has no administration in time range)   ondansetron disintegrating tablet 4 mg (has no administration in time range)   dexAMETHasone injection 4 mg (has no administration in time range)     Medical Decision Making  Amount and/or Complexity of Data Reviewed  Labs: ordered.  Radiology: ordered.    Risk  Prescription drug management.              Attending Attestation:           Physician Attestation for Scribe:  Physician Attestation Statement for Scribe #1: I, Natanael Alas DO, reviewed documentation, as scribed by Verna Harris in my presence, and it is both accurate and complete.             ED Course as of 03/23/24 2125   Sat Mar 23, 2024    2017 Xray Chest AP:  No acute cardiopulmonary process demonstrated.  Similar irregular opacity within the right lower lung. Consider PET-CT. [BW]      ED Course User Index  [BW] Verna Harris               Medical Decision Making:   Initial Assessment:   Patient comes in with pain in the right hip lower back and leg  Differential Diagnosis:   History of lung cancer  ED Management:  Follow-up primary care provider and [facility]             Clinical Impression:  Final diagnoses:  [R06.02] SOB (shortness of breath)                 Jame Alas, DO  03/23/24 2648

## 2024-03-24 NOTE — ED TRIAGE NOTES
Pt states that he has SOB all the time has a hx of COPD, but states the SOB got worse this am. Is also having right leg & lower back pain that got worse this am also.

## 2025-02-26 ENCOUNTER — HOSPITAL ENCOUNTER (EMERGENCY)
Facility: HOSPITAL | Age: 65
Discharge: HOME | End: 2025-02-26
Payer: OTHER GOVERNMENT

## 2025-02-26 VITALS
OXYGEN SATURATION: 91 % | SYSTOLIC BLOOD PRESSURE: 96 MMHG | HEIGHT: 67 IN | HEART RATE: 93 BPM | DIASTOLIC BLOOD PRESSURE: 58 MMHG | RESPIRATION RATE: 17 BRPM | TEMPERATURE: 98 F | BODY MASS INDEX: 27.78 KG/M2 | WEIGHT: 177 LBS

## 2025-02-26 DIAGNOSIS — R05.9 COUGH: ICD-10-CM

## 2025-02-26 DIAGNOSIS — J06.9 UPPER RESPIRATORY TRACT INFECTION, UNSPECIFIED TYPE: Primary | ICD-10-CM

## 2025-02-26 LAB
ANION GAP SERPL CALCULATED.3IONS-SCNC: 14 MMOL/L (ref 7–16)
BASOPHILS # BLD AUTO: 0.08 K/UL (ref 0–0.2)
BASOPHILS NFR BLD AUTO: 0.9 % (ref 0–1)
BUN SERPL-MCNC: 20 MG/DL (ref 8–26)
BUN/CREAT SERPL: 10 (ref 6–20)
CALCIUM SERPL-MCNC: 10.1 MG/DL (ref 8.8–10)
CHLORIDE SERPL-SCNC: 101 MMOL/L (ref 98–107)
CO2 SERPL-SCNC: 28 MMOL/L (ref 23–31)
CREAT SERPL-MCNC: 1.91 MG/DL (ref 0.72–1.25)
DIFFERENTIAL METHOD BLD: ABNORMAL
EGFR (NO RACE VARIABLE) (RUSH/TITUS): 39 ML/MIN/1.73M2
EOSINOPHIL # BLD AUTO: 0.04 K/UL (ref 0–0.5)
EOSINOPHIL NFR BLD AUTO: 0.4 % (ref 1–4)
ERYTHROCYTE [DISTWIDTH] IN BLOOD BY AUTOMATED COUNT: 13.1 % (ref 11.5–14.5)
GLUCOSE SERPL-MCNC: 106 MG/DL (ref 82–115)
HCT VFR BLD AUTO: 40.3 % (ref 40–54)
HGB BLD-MCNC: 13.1 G/DL (ref 13.5–18)
IMM GRANULOCYTES # BLD AUTO: 0.03 K/UL (ref 0–0.04)
IMM GRANULOCYTES NFR BLD: 0.3 % (ref 0–0.4)
INFLUENZA A MOLECULAR (OHS): NEGATIVE
INFLUENZA B MOLECULAR (OHS): NEGATIVE
LYMPHOCYTES # BLD AUTO: 0.71 K/UL (ref 1–4.8)
LYMPHOCYTES NFR BLD AUTO: 7.8 % (ref 27–41)
MCH RBC QN AUTO: 28.7 PG (ref 27–31)
MCHC RBC AUTO-ENTMCNC: 32.5 G/DL (ref 32–36)
MCV RBC AUTO: 88.2 FL (ref 80–96)
MONOCYTES # BLD AUTO: 0.87 K/UL (ref 0–0.8)
MONOCYTES NFR BLD AUTO: 9.5 % (ref 2–6)
MPC BLD CALC-MCNC: 11.9 FL (ref 9.4–12.4)
NEUTROPHILS # BLD AUTO: 7.41 K/UL (ref 1.8–7.7)
NEUTROPHILS NFR BLD AUTO: 81.1 % (ref 53–65)
NRBC # BLD AUTO: 0 X10E3/UL
NRBC, AUTO (.00): 0 %
PLATELET # BLD AUTO: 213 K/UL (ref 150–400)
POTASSIUM SERPL-SCNC: 4.3 MMOL/L (ref 3.5–5.1)
RBC # BLD AUTO: 4.57 M/UL (ref 4.6–6.2)
SARS-COV-2 RDRP RESP QL NAA+PROBE: NEGATIVE
SODIUM SERPL-SCNC: 139 MMOL/L (ref 136–145)
WBC # BLD AUTO: 9.14 K/UL (ref 4.5–11)

## 2025-02-26 PROCEDURE — 87635 SARS-COV-2 COVID-19 AMP PRB: CPT | Performed by: NURSE PRACTITIONER

## 2025-02-26 PROCEDURE — 87502 INFLUENZA DNA AMP PROBE: CPT | Performed by: NURSE PRACTITIONER

## 2025-02-26 PROCEDURE — 36415 COLL VENOUS BLD VENIPUNCTURE: CPT | Performed by: NURSE PRACTITIONER

## 2025-02-26 PROCEDURE — 25000003 PHARM REV CODE 250: Performed by: NURSE PRACTITIONER

## 2025-02-26 PROCEDURE — 99284 EMERGENCY DEPT VISIT MOD MDM: CPT | Mod: 25

## 2025-02-26 PROCEDURE — 80048 BASIC METABOLIC PNL TOTAL CA: CPT | Performed by: NURSE PRACTITIONER

## 2025-02-26 PROCEDURE — 85025 COMPLETE CBC W/AUTO DIFF WBC: CPT | Performed by: NURSE PRACTITIONER

## 2025-02-26 PROCEDURE — 63600175 PHARM REV CODE 636 W HCPCS: Performed by: NURSE PRACTITIONER

## 2025-02-26 PROCEDURE — 96374 THER/PROPH/DIAG INJ IV PUSH: CPT

## 2025-02-26 RX ORDER — AZITHROMYCIN 250 MG/1
250 TABLET, FILM COATED ORAL DAILY
Qty: 6 TABLET | Refills: 0 | Status: SHIPPED | OUTPATIENT
Start: 2025-02-26

## 2025-02-26 RX ORDER — ELECTROLYTES/DEXTROSE
600 SOLUTION, ORAL ORAL
Status: DISCONTINUED | OUTPATIENT
Start: 2025-02-27 | End: 2025-02-27 | Stop reason: HOSPADM

## 2025-02-26 RX ORDER — CEFTRIAXONE 1 G/1
1 INJECTION, POWDER, FOR SOLUTION INTRAMUSCULAR; INTRAVENOUS
Status: COMPLETED | OUTPATIENT
Start: 2025-02-26 | End: 2025-02-26

## 2025-02-26 RX ORDER — SODIUM CHLORIDE 9 MG/ML
1000 INJECTION, SOLUTION INTRAVENOUS
Status: DISCONTINUED | OUTPATIENT
Start: 2025-02-26 | End: 2025-02-27 | Stop reason: HOSPADM

## 2025-02-26 RX ORDER — CEFDINIR 300 MG/1
300 CAPSULE ORAL 2 TIMES DAILY
Qty: 14 CAPSULE | Refills: 0 | Status: SHIPPED | OUTPATIENT
Start: 2025-02-26 | End: 2025-03-05

## 2025-02-26 RX ADMIN — Medication 600 ML: at 11:02

## 2025-02-26 RX ADMIN — CEFTRIAXONE SODIUM 1 G: 1 INJECTION, POWDER, FOR SOLUTION INTRAMUSCULAR; INTRAVENOUS at 11:02

## 2025-02-27 NOTE — ED PROVIDER NOTES
"Encounter Date: 2/26/2025       History     Chief Complaint   Patient presents with    URI     Cough, bodyaches      64 year old male presents to ED for URI symptoms. Patient states he woke up this morning feeling "rough" and reports having runny nose, cough, and fever. He reports having temp max of 102. He states he took aleve and a pain pill approximately 3-4 hours ago. He reports having nausea without vomiting. He reports having history of COPD and states he took a breathing treatment on today for shortness of breath he was experiencing. He states he took his girlfriend's father home from the hospital yesterday and he was positive for influenza.     The history is provided by the patient.     Review of patient's allergies indicates:   Allergen Reactions    Aloe vera latex gloves [gloves, latex with aloe vera] Rash    Latex Hives and Rash     Past Medical History:   Diagnosis Date    Arthritis     Cancer     AdenoCarcinoma RLL : XRT  ~ 7121-6755 Felix    CHF (congestive heart failure)     Chronic pain     Coronary artery disease     Diabetes mellitus     Hypertension     Mixed hyperlipidemia      Past Surgical History:   Procedure Laterality Date    CARDIAC SURGERY      EYE SURGERY      hand fracture repair      NECK SURGERY N/A     ~ 2016 [place]    STOMACH SURGERY       Family History   Problem Relation Name Age of Onset    Heart disease Mother      Lung cancer Father      Heart disease Father       Social History[1]  Review of Systems   Constitutional:  Positive for fatigue and fever. Negative for appetite change.   Eyes:  Negative for photophobia and visual disturbance.   Respiratory:  Positive for cough and shortness of breath.    Cardiovascular:  Negative for chest pain and palpitations.   Gastrointestinal:  Positive for nausea. Negative for vomiting.   Musculoskeletal:  Negative for arthralgias and gait problem.   Skin:  Negative for color change and wound.   Neurological:  Negative for dizziness and " weakness.   Hematological:  Negative for adenopathy. Does not bruise/bleed easily.   Psychiatric/Behavioral:  Negative for agitation and confusion.    All other systems reviewed and are negative.      Physical Exam     Initial Vitals [02/26/25 2117]   BP Pulse Resp Temp SpO2   (!) 96/58 93 17 98.4 °F (36.9 °C) (!) 91 %      MAP       --         Physical Exam    Nursing note and vitals reviewed.  Constitutional: He appears well-developed and well-nourished.   HENT:   Head: Normocephalic and atraumatic.   Eyes: Pupils are equal, round, and reactive to light.   Neck: Neck supple.   Normal range of motion.  Cardiovascular:  Normal rate and regular rhythm.           No murmur heard.  Pulmonary/Chest: He has no wheezes. He has rhonchi.   Abdominal: Abdomen is soft. He exhibits no distension. There is no abdominal tenderness.   Musculoskeletal:         General: No tenderness or edema.      Cervical back: Normal range of motion and neck supple.     Lymphadenopathy:     He has no cervical adenopathy.   Neurological: He is alert and oriented to person, place, and time. No cranial nerve deficit or sensory deficit.   Skin: Skin is warm and dry. Capillary refill takes less than 2 seconds.   Psychiatric: He has a normal mood and affect. Thought content normal.         Medical Screening Exam   See Full Note    ED Course   Procedures  Labs Reviewed   BASIC METABOLIC PANEL - Abnormal       Result Value    Sodium 139      Potassium 4.3      Chloride 101      CO2 28      Anion Gap 14      Glucose 106      BUN 20      Creatinine 1.91 (*)     BUN/Creatinine Ratio 10      Calcium 10.1 (*)     eGFR 39 (*)    CBC WITH DIFFERENTIAL - Abnormal    WBC 9.14      RBC 4.57 (*)     Hemoglobin 13.1 (*)     Hematocrit 40.3      MCV 88.2      MCH 28.7      MCHC 32.5      RDW 13.1      Platelet Count 213      MPV 11.9      Neutrophils % 81.1 (*)     Lymphocytes % 7.8 (*)     Monocytes % 9.5 (*)     Eosinophils % 0.4 (*)     Basophils % 0.9       "Immature Granulocytes % 0.3      nRBC, Auto 0.0      Neutrophils, Abs 7.41      Lymphocytes, Absolute 0.71 (*)     Monocytes, Absolute 0.87 (*)     Eosinophils, Absolute 0.04      Basophils, Absolute 0.08      Immature Granulocytes, Absolute 0.03      nRBC, Absolute 0.00      Diff Type Auto     INFLUENZA A & B BY MOLECULAR - Normal    INFLUENZA A MOLECULAR Negative      INFLUENZA B MOLECULAR  Negative     SARS-COV-2 RNA AMPLIFICATION, QUAL - Normal    SARS COV-2 Molecular Negative      Narrative:     Negative SARS-CoV results should not be used as the sole basis for treatment or patient management decisions; negative results should be considered in the context of a patient's recent exposures, history and the presene of clinical signs and symptoms consistent with COVID-19.  Negative results should be treated as presumptive and confirmed by molecular assay, if necessary for patient management.   CBC W/ AUTO DIFFERENTIAL    Narrative:     The following orders were created for panel order CBC auto differential.  Procedure                               Abnormality         Status                     ---------                               -----------         ------                     CBC with Differential[[id number]]       Abnormal            Final result                 Please view results for these tests on the individual orders.          Imaging Results              X-Ray Chest PA And Lateral (In process)                      Medications   0.9% NaCl infusion (1,000 mLs Intravenous Not Given 2/26/25 2330)   electrolytes-dextrose (Pedialyte) oral solution 600 mL (600 mLs Oral Given 2/26/25 2347)   cefTRIAXone injection 1 g (1 g Intravenous Given 2/26/25 2344)     Medical Decision Making  64 year old male presents to ED for URI symptoms. Patient states he woke up this morning feeling "rough" and reports having runny nose, cough, and fever. He reports having temp max of 102. He states he took aleve and a pain pill " approximately 3-4 hours ago. He reports having nausea without vomiting. He reports having history of COPD and states he took a breathing treatment on today for shortness of breath he was experiencing. He states he took his girlfriend's father home from the hospital yesterday and he was positive for influenza.     Labs, diagnostics ordered and reviewed  Radiologist interpretation reviewed  IM Rocephin administered  Prescriptions provided    IV NS ordered; patient declined; wanted Pedialyte    Amount and/or Complexity of Data Reviewed  Labs: ordered.  Radiology: ordered and independent interpretation performed.     Details: Reviewed with prior x-rays; unchanged  Hx of lung cancer.    Risk  OTC drugs.  Prescription drug management.                                      Clinical Impression:   Final diagnoses:  [R05.9] Cough  [J06.9] Upper respiratory tract infection, unspecified type (Primary)        ED Disposition Condition    Discharge Stable          ED Prescriptions       Medication Sig Dispense Start Date End Date Auth. Provider    azithromycin (Z-HILL) 250 MG tablet Take 1 tablet (250 mg total) by mouth once daily. Take first 2 tablets together, then 1 every day until finished. 6 tablet 2/26/2025 -- Allison Blue FNP    cefdinir (OMNICEF) 300 MG capsule Take 1 capsule (300 mg total) by mouth 2 (two) times daily. for 7 days 14 capsule 2/26/2025 3/5/2025 Allison Blue FNP          Follow-up Information    None              [1]   Social History  Tobacco Use    Smoking status: Every Day     Current packs/day: 0.50     Average packs/day: 1 pack/day for 41.5 years (40.7 ttl pk-yrs)     Types: Cigarettes     Start date: 8/29/1983    Smokeless tobacco: Never    Tobacco comments:     Trying to cut back    Substance Use Topics    Alcohol use: Never    Drug use: Never        Allison Blue FNP  02/26/25 8784

## 2025-07-05 ENCOUNTER — HOSPITAL ENCOUNTER (EMERGENCY)
Facility: HOSPITAL | Age: 65
Discharge: HOME | End: 2025-07-05
Attending: EMERGENCY MEDICINE
Payer: OTHER GOVERNMENT

## 2025-07-05 VITALS
OXYGEN SATURATION: 92 % | HEIGHT: 67 IN | SYSTOLIC BLOOD PRESSURE: 155 MMHG | TEMPERATURE: 98 F | DIASTOLIC BLOOD PRESSURE: 62 MMHG | RESPIRATION RATE: 11 BRPM | BODY MASS INDEX: 29.35 KG/M2 | HEART RATE: 101 BPM | WEIGHT: 187 LBS

## 2025-07-05 DIAGNOSIS — R06.00 DYSPNEA: ICD-10-CM

## 2025-07-05 DIAGNOSIS — R06.02 SOB (SHORTNESS OF BREATH): ICD-10-CM

## 2025-07-05 DIAGNOSIS — J44.1 COPD EXACERBATION: Primary | ICD-10-CM

## 2025-07-05 LAB
ALBUMIN SERPL BCP-MCNC: 3.6 G/DL (ref 3.4–4.8)
ALBUMIN/GLOB SERPL: 1.1 {RATIO}
ALP SERPL-CCNC: 82 U/L (ref 40–150)
ALT SERPL W P-5'-P-CCNC: <7 U/L
ANION GAP SERPL CALCULATED.3IONS-SCNC: 10 MMOL/L (ref 7–16)
AST SERPL W P-5'-P-CCNC: 21 U/L (ref 11–45)
BASOPHILS # BLD AUTO: 0.16 K/UL (ref 0–0.2)
BASOPHILS NFR BLD AUTO: 1.7 % (ref 0–1)
BILIRUB SERPL-MCNC: 0.6 MG/DL
BUN SERPL-MCNC: 21 MG/DL (ref 8–26)
BUN/CREAT SERPL: 11 (ref 6–20)
CALCIUM SERPL-MCNC: 9.3 MG/DL (ref 8.8–10)
CHLORIDE SERPL-SCNC: 107 MMOL/L (ref 98–107)
CO2 SERPL-SCNC: 26 MMOL/L (ref 23–31)
CREAT SERPL-MCNC: 1.86 MG/DL (ref 0.72–1.25)
DIFFERENTIAL METHOD BLD: ABNORMAL
EGFR (NO RACE VARIABLE) (RUSH/TITUS): 40 ML/MIN/1.73M2
EOSINOPHIL # BLD AUTO: 0.33 K/UL (ref 0–0.5)
EOSINOPHIL NFR BLD AUTO: 3.5 % (ref 1–4)
ERYTHROCYTE [DISTWIDTH] IN BLOOD BY AUTOMATED COUNT: 13.6 % (ref 11.5–14.5)
GLOBULIN SER-MCNC: 3.3 G/DL (ref 2–4)
GLUCOSE SERPL-MCNC: 123 MG/DL (ref 82–115)
HCT VFR BLD AUTO: 42.5 % (ref 40–54)
HGB BLD-MCNC: 13.5 G/DL (ref 13.5–18)
IMM GRANULOCYTES # BLD AUTO: 0.02 K/UL (ref 0–0.04)
IMM GRANULOCYTES NFR BLD: 0.2 % (ref 0–0.4)
LYMPHOCYTES # BLD AUTO: 3.67 K/UL (ref 1–4.8)
LYMPHOCYTES NFR BLD AUTO: 38.4 % (ref 27–41)
MAGNESIUM SERPL-MCNC: 1.8 MG/DL (ref 1.6–2.6)
MCH RBC QN AUTO: 27.9 PG (ref 27–31)
MCHC RBC AUTO-ENTMCNC: 31.8 G/DL (ref 32–36)
MCV RBC AUTO: 87.8 FL (ref 80–96)
MONOCYTES # BLD AUTO: 0.84 K/UL (ref 0–0.8)
MONOCYTES NFR BLD AUTO: 8.8 % (ref 2–6)
MPC BLD CALC-MCNC: 11.5 FL (ref 9.4–12.4)
NEUTROPHILS # BLD AUTO: 4.53 K/UL (ref 1.8–7.7)
NEUTROPHILS NFR BLD AUTO: 47.4 % (ref 53–65)
NRBC # BLD AUTO: 0 X10E3/UL
NRBC, AUTO (.00): 0 %
NT-PROBNP SERPL-MCNC: 223 PG/ML (ref 1–125)
PLATELET # BLD AUTO: 252 K/UL (ref 150–400)
POTASSIUM SERPL-SCNC: 4.4 MMOL/L (ref 3.5–5.1)
PROT SERPL-MCNC: 6.9 G/DL (ref 5.8–7.6)
RBC # BLD AUTO: 4.84 M/UL (ref 4.6–6.2)
SODIUM SERPL-SCNC: 139 MMOL/L (ref 136–145)
TROPONIN I SERPL HS-MCNC: 4.2 NG/L
WBC # BLD AUTO: 9.55 K/UL (ref 4.5–11)

## 2025-07-05 PROCEDURE — 83880 ASSAY OF NATRIURETIC PEPTIDE: CPT | Performed by: EMERGENCY MEDICINE

## 2025-07-05 PROCEDURE — 25000242 PHARM REV CODE 250 ALT 637 W/ HCPCS: Performed by: EMERGENCY MEDICINE

## 2025-07-05 PROCEDURE — 99285 EMERGENCY DEPT VISIT HI MDM: CPT | Mod: 25

## 2025-07-05 PROCEDURE — 25000242 PHARM REV CODE 250 ALT 637 W/ HCPCS

## 2025-07-05 PROCEDURE — 63600175 PHARM REV CODE 636 W HCPCS: Mod: JZ,TB | Performed by: EMERGENCY MEDICINE

## 2025-07-05 PROCEDURE — 80053 COMPREHEN METABOLIC PANEL: CPT | Performed by: EMERGENCY MEDICINE

## 2025-07-05 PROCEDURE — 84484 ASSAY OF TROPONIN QUANT: CPT | Performed by: EMERGENCY MEDICINE

## 2025-07-05 PROCEDURE — 85025 COMPLETE CBC W/AUTO DIFF WBC: CPT | Performed by: EMERGENCY MEDICINE

## 2025-07-05 PROCEDURE — 83735 ASSAY OF MAGNESIUM: CPT | Performed by: EMERGENCY MEDICINE

## 2025-07-05 PROCEDURE — 96374 THER/PROPH/DIAG INJ IV PUSH: CPT

## 2025-07-05 PROCEDURE — 93005 ELECTROCARDIOGRAM TRACING: CPT

## 2025-07-05 RX ORDER — METHYLPREDNISOLONE SOD SUCC 125 MG
125 VIAL (EA) INJECTION
Status: COMPLETED | OUTPATIENT
Start: 2025-07-05 | End: 2025-07-05

## 2025-07-05 RX ORDER — PREDNISONE 20 MG/1
TABLET ORAL
Qty: 18 TABLET | Refills: 0 | Status: SHIPPED | OUTPATIENT
Start: 2025-07-05

## 2025-07-05 RX ORDER — AZITHROMYCIN 500 MG/1
500 TABLET, FILM COATED ORAL DAILY
Qty: 3 TABLET | Refills: 0 | Status: SHIPPED | OUTPATIENT
Start: 2025-07-05 | End: 2025-07-08

## 2025-07-05 RX ORDER — IPRATROPIUM BROMIDE AND ALBUTEROL SULFATE 2.5; .5 MG/3ML; MG/3ML
SOLUTION RESPIRATORY (INHALATION)
Status: COMPLETED
Start: 2025-07-05 | End: 2025-07-05

## 2025-07-05 RX ORDER — ALBUTEROL SULFATE 0.83 MG/ML
2.5 SOLUTION RESPIRATORY (INHALATION)
Status: DISPENSED | OUTPATIENT
Start: 2025-07-05 | End: 2025-07-05

## 2025-07-05 RX ORDER — IPRATROPIUM BROMIDE AND ALBUTEROL SULFATE 2.5; .5 MG/3ML; MG/3ML
3 SOLUTION RESPIRATORY (INHALATION)
Status: COMPLETED | OUTPATIENT
Start: 2025-07-05 | End: 2025-07-05

## 2025-07-05 RX ADMIN — IPRATROPIUM BROMIDE AND ALBUTEROL SULFATE 3 ML: 2.5; .5 SOLUTION RESPIRATORY (INHALATION) at 08:07

## 2025-07-05 RX ADMIN — METHYLPREDNISOLONE SODIUM SUCCINATE 125 MG: 125 INJECTION, POWDER, FOR SOLUTION INTRAMUSCULAR; INTRAVENOUS at 08:07

## 2025-07-05 RX ADMIN — IPRATROPIUM BROMIDE AND ALBUTEROL SULFATE 3 ML: .5; 3 SOLUTION RESPIRATORY (INHALATION) at 08:07

## 2025-07-05 RX ADMIN — ALBUTEROL SULFATE 2.5 MG: 2.5 SOLUTION RESPIRATORY (INHALATION) at 11:07

## 2025-07-06 NOTE — DISCHARGE INSTRUCTIONS
Take azithromycin and prednisone as directed.  Drink plenty of fluids.  Follow up with your primary care provider.  Return to the emergency department as needed.

## 2025-07-06 NOTE — ED PROVIDER NOTES
Encounter Date: 7/5/2025    SCRIBE #1 NOTE: I, Verna Harris, am scribing for, and in the presence of,  Micheal Long MD. I have scribed the entire note.       History     Chief Complaint   Patient presents with    Shortness of Breath     Pt c/o SOB for the last several days. Has hx of COPD denies home oxygen use.      This is a 63 y/o male,who presents to the ED with complaints of several day long hx of shortness of breath which worsened over the afternoon. He he has a known hx of COPD and lung cancer. He is not on home O2 at this point. He notes he is a current every day smoker but has cut back a significant amount. There is no hx of chest pain, fever, chills, or leg swelling. There are no other complaints/pain in the ED at this time. He has a known hx of CHF, CAD, diabetes, HTN, MI, and hyperlipidemia. He has had a cardiac surgery in the past.     The history is provided by the patient and medical records. No  was used.     Review of patient's allergies indicates:   Allergen Reactions    Aloe vera latex gloves [gloves, latex with aloe vera] Rash    Latex Hives and Rash     Past Medical History:   Diagnosis Date    Arthritis     Cancer     AdenoCarcinoma RLL : XRT  ~ 5927-7305 [place]    CHF (congestive heart failure)     Chronic pain     COPD (chronic obstructive pulmonary disease)     Coronary artery disease     Diabetes mellitus     Heart attack     Hypertension     Mixed hyperlipidemia      Past Surgical History:   Procedure Laterality Date    CARDIAC SURGERY      EYE SURGERY      hand fracture repair      NECK SURGERY N/A     ~ 2016 [place]    STOMACH SURGERY       Family History   Problem Relation Name Age of Onset    Heart disease Mother      Lung cancer Father      Heart disease Father       Social History[1]  Review of Systems   Constitutional:  Negative for chills and fever.   Respiratory:  Positive for shortness of breath. Negative for cough.    Cardiovascular:  Negative for  chest pain and leg swelling.   All other systems reviewed and are negative.      Physical Exam     Initial Vitals [07/05/25 2004]   BP Pulse Resp Temp SpO2   (!) 155/62 88 14 98.2 °F (36.8 °C) 96 %      MAP       --         Physical Exam    Nursing note and vitals reviewed.  Constitutional:   Pt is obese     HENT:   Head: Normocephalic and atraumatic.   Eyes: Conjunctivae and EOM are normal. Pupils are equal, round, and reactive to light.   Neck: Neck supple.   Normal range of motion.  Cardiovascular:  Normal rate, regular rhythm, normal heart sounds and intact distal pulses.           Pulmonary/Chest: He has wheezes (Expiratory wheezing noted.).   Prolonged expiratory phase bilaterally.      Abdominal: Abdomen is soft. Bowel sounds are normal.   Musculoskeletal:         General: Normal range of motion.      Cervical back: Normal range of motion and neck supple.     Neurological: He is alert and oriented to person, place, and time. He has normal strength.   Skin: Skin is warm and dry. Capillary refill takes less than 2 seconds.   Psychiatric: He has a normal mood and affect. Thought content normal.         ED Course   Procedures  Labs Reviewed   COMPREHENSIVE METABOLIC PANEL - Abnormal       Result Value    Sodium 139      Potassium 4.4      Chloride 107      CO2 26      Anion Gap 10      Glucose 123 (*)     BUN 21      Creatinine 1.86 (*)     BUN/Creatinine Ratio 11      Calcium 9.3      Total Protein 6.9      Albumin 3.6      Globulin 3.3      A/G Ratio 1.1      Bilirubin, Total 0.6      Alk Phos 82      ALT <7      AST 21      eGFR 40 (*)    CBC WITH DIFFERENTIAL - Abnormal    WBC 9.55      RBC 4.84      Hemoglobin 13.5      Hematocrit 42.5      MCV 87.8      MCH 27.9      MCHC 31.8 (*)     RDW 13.6      Platelet Count 252      MPV 11.5      Neutrophils % 47.4 (*)     Lymphocytes % 38.4      Monocytes % 8.8 (*)     Eosinophils % 3.5      Basophils % 1.7 (*)     Immature Granulocytes % 0.2      nRBC, Auto 0.0       Neutrophils, Abs 4.53      Lymphocytes, Absolute 3.67      Monocytes, Absolute 0.84 (*)     Eosinophils, Absolute 0.33      Basophils, Absolute 0.16      Immature Granulocytes, Absolute 0.02      nRBC, Absolute 0.00      Diff Type Auto     NT-PRO NATRIURETIC PEPTIDE - Abnormal    ProBNP 223 (*)    TROPONIN I - Normal    Troponin I High Sensitivity 4.2     MAGNESIUM - Normal    Magnesium 1.8     CBC W/ AUTO DIFFERENTIAL    Narrative:     The following orders were created for panel order CBC auto differential.  Procedure                               Abnormality         Status                     ---------                               -----------         ------                     CBC with Differential[[id number]]       Abnormal            Final result                 Please view results for these tests on the individual orders.          Imaging Results              X-Ray Chest AP Portable (In process)                      Medications   albuterol nebulizer solution 2.5 mg (2.5 mg Nebulization Given 7/5/25 2300)   albuterol-ipratropium 2.5 mg-0.5 mg/3 mL nebulizer solution 3 mL (3 mLs Nebulization Given 7/5/25 2011)   methylPREDNISolone sodium succinate injection 125 mg (125 mg Intravenous Given 7/5/25 2013)     Medical Decision Making  This is a 65 y/o male,who presents to the ED with complaints of several day long hx of shortness of breath which worsened over the afternoon. He he has a known hx of COPD and lung cancer. He is not on home O2 at this point. He notes he is a current every day smoker but has cut back a significant amount. There is no hx of chest pain, fever, chills, or leg swelling. There are no other complaints/pain in the ED at this time. He has a known hx of CHF, CAD, diabetes, HTN, MI, and hyperlipidemia. He has had a cardiac surgery in the past. This is a 65 y/o male,who presents to the ED with complaints of several day long hx of shortness of breath which worsened over the afternoon. He he has a  known hx of COPD and lung cancer. He is not on home O2 at this point. He notes he is a current every day smoker but has cut back a significant amount. There is no hx of chest pain, fever, chills, or leg swelling. There are no other complaints/pain in the ED at this time. He has a known hx of CHF, CAD, diabetes, HTN, MI, and hyperlipidemia. He has had a cardiac surgery in the past.   Ddx:  chf vs copd vs pneumonia vs other....      Amount and/or Complexity of Data Reviewed  Independent Historian:      Details: We spoke with the pt.   Labs: ordered. Decision-making details documented in ED Course.  Radiology: ordered. Decision-making details documented in ED Course.  ECG/medicine tests: ordered and independent interpretation performed.     Details: Sinus mechanism.  Rate 87.  Nonspecific lateral t-wave abnormality.  Borderline ECG.      Risk  Prescription drug management.              Attending Attestation:           Physician Attestation for Scribe:  Physician Attestation Statement for Scribe #1: I, Micheal Long MD, reviewed documentation, as scribed by Verna Harris in my presence, and it is both accurate and complete.                                    Clinical Impression:  Final diagnoses:  [R06.02] SOB (shortness of breath)  [R06.00] Dyspnea  [J44.1] COPD exacerbation (Primary)          ED Disposition Condition    Discharge Stable          ED Prescriptions       Medication Sig Dispense Start Date End Date Auth. Provider    predniSONE (DELTASONE) 20 MG tablet 3 tablets po daily x 3 days, then 2 tablets po daily x 3 days, then 1 tablet po daily x 3 days, then stop. 18 tablet 7/5/2025 -- Micheal Long MD    azithromycin (ZITHROMAX) 500 MG tablet Take 1 tablet (500 mg total) by mouth once daily. for 3 days 3 tablet 7/5/2025 7/8/2025 Micheal Long MD          Follow-up Information       Follow up With Specialties Details Why Contact Info    [facility] - Emergency Department Emergency Medicine   As needed [address] [place] [id number] [phone]                   [1]   Social History  Tobacco Use    Smoking status: Every Day     Current packs/day: 0.50     Average packs/day: 1 pack/day for 41.8 years (40.8 ttl pk-yrs)     Types: Cigarettes     Start date: 8/29/1983    Smokeless tobacco: Never    Tobacco comments:     Trying to cut back    Substance Use Topics    Alcohol use: Never    Drug use: Never        Micheal Long MD  07/05/25 9561

## 2025-07-22 ENCOUNTER — HOSPITAL ENCOUNTER (EMERGENCY)
Facility: HOSPITAL | Age: 65
Discharge: HOME | End: 2025-07-22
Attending: FAMILY MEDICINE
Payer: OTHER GOVERNMENT

## 2025-07-22 VITALS
BODY MASS INDEX: 28.56 KG/M2 | OXYGEN SATURATION: 95 % | HEART RATE: 70 BPM | TEMPERATURE: 98 F | RESPIRATION RATE: 20 BRPM | WEIGHT: 182 LBS | DIASTOLIC BLOOD PRESSURE: 79 MMHG | SYSTOLIC BLOOD PRESSURE: 123 MMHG | HEIGHT: 67 IN

## 2025-07-22 DIAGNOSIS — R06.02 SOB (SHORTNESS OF BREATH): ICD-10-CM

## 2025-07-22 LAB
ALBUMIN SERPL BCP-MCNC: 3.5 G/DL (ref 3.4–4.8)
ALBUMIN/GLOB SERPL: 1 {RATIO}
ALP SERPL-CCNC: 73 U/L (ref 40–150)
ALT SERPL W P-5'-P-CCNC: 12 U/L
ANION GAP SERPL CALCULATED.3IONS-SCNC: 14 MMOL/L (ref 7–16)
AST SERPL W P-5'-P-CCNC: 16 U/L (ref 11–45)
BASOPHILS # BLD AUTO: 0.01 K/UL (ref 0–0.2)
BASOPHILS NFR BLD AUTO: 0.1 % (ref 0–1)
BILIRUB SERPL-MCNC: 0.9 MG/DL
BUN SERPL-MCNC: 21 MG/DL (ref 8–26)
BUN/CREAT SERPL: 13 (ref 6–20)
CALCIUM SERPL-MCNC: 9.6 MG/DL (ref 8.8–10)
CHLORIDE SERPL-SCNC: 102 MMOL/L (ref 98–107)
CO2 SERPL-SCNC: 27 MMOL/L (ref 23–31)
CREAT SERPL-MCNC: 1.64 MG/DL (ref 0.72–1.25)
DIFFERENTIAL METHOD BLD: ABNORMAL
EGFR (NO RACE VARIABLE) (RUSH/TITUS): 47 ML/MIN/1.73M2
EOSINOPHIL # BLD AUTO: 0.01 K/UL (ref 0–0.5)
EOSINOPHIL NFR BLD AUTO: 0.1 % (ref 1–4)
ERYTHROCYTE [DISTWIDTH] IN BLOOD BY AUTOMATED COUNT: 13.4 % (ref 11.5–14.5)
GLOBULIN SER-MCNC: 3.6 G/DL (ref 2–4)
GLUCOSE SERPL-MCNC: 157 MG/DL (ref 82–115)
HCT VFR BLD AUTO: 47 % (ref 40–54)
HGB BLD-MCNC: 15.1 G/DL (ref 13.5–18)
IMM GRANULOCYTES # BLD AUTO: 0.06 K/UL (ref 0–0.04)
IMM GRANULOCYTES NFR BLD: 0.5 % (ref 0–0.4)
LYMPHOCYTES # BLD AUTO: 1.34 K/UL (ref 1–4.8)
LYMPHOCYTES NFR BLD AUTO: 10.5 % (ref 27–41)
MCH RBC QN AUTO: 28 PG (ref 27–31)
MCHC RBC AUTO-ENTMCNC: 32.1 G/DL (ref 32–36)
MCV RBC AUTO: 87.2 FL (ref 80–96)
MONOCYTES # BLD AUTO: 0.49 K/UL (ref 0–0.8)
MONOCYTES NFR BLD AUTO: 3.8 % (ref 2–6)
MPC BLD CALC-MCNC: 11.5 FL (ref 9.4–12.4)
NEUTROPHILS # BLD AUTO: 10.87 K/UL (ref 1.8–7.7)
NEUTROPHILS NFR BLD AUTO: 85 % (ref 53–65)
NRBC # BLD AUTO: 0 X10E3/UL
NRBC, AUTO (.00): 0 %
NT-PROBNP SERPL-MCNC: 913 PG/ML (ref 1–125)
PLATELET # BLD AUTO: 306 K/UL (ref 150–400)
POTASSIUM SERPL-SCNC: 3.9 MMOL/L (ref 3.5–5.1)
PROT SERPL-MCNC: 7.1 G/DL (ref 5.8–7.6)
RBC # BLD AUTO: 5.39 M/UL (ref 4.6–6.2)
SODIUM SERPL-SCNC: 139 MMOL/L (ref 136–145)
TROPONIN I SERPL HS-MCNC: 4.8 NG/L
TROPONIN I SERPL HS-MCNC: 8.5 NG/L
WBC # BLD AUTO: 12.78 K/UL (ref 4.5–11)

## 2025-07-22 PROCEDURE — 93005 ELECTROCARDIOGRAM TRACING: CPT

## 2025-07-22 PROCEDURE — 85025 COMPLETE CBC W/AUTO DIFF WBC: CPT | Performed by: FAMILY MEDICINE

## 2025-07-22 PROCEDURE — 93010 ELECTROCARDIOGRAM REPORT: CPT | Mod: ,,, | Performed by: INTERNAL MEDICINE

## 2025-07-22 PROCEDURE — 99285 EMERGENCY DEPT VISIT HI MDM: CPT | Mod: 25

## 2025-07-22 PROCEDURE — 84484 ASSAY OF TROPONIN QUANT: CPT | Performed by: FAMILY MEDICINE

## 2025-07-22 PROCEDURE — 80053 COMPREHEN METABOLIC PANEL: CPT | Performed by: FAMILY MEDICINE

## 2025-07-22 PROCEDURE — 36415 COLL VENOUS BLD VENIPUNCTURE: CPT | Performed by: FAMILY MEDICINE

## 2025-07-22 PROCEDURE — 83880 ASSAY OF NATRIURETIC PEPTIDE: CPT | Performed by: FAMILY MEDICINE

## 2025-07-22 NOTE — DISCHARGE INSTRUCTIONS
Follow-up with Dr. Ricki Moy at [facility]       in [place] at regular scheduled time in 2 days

## 2025-07-22 NOTE — ED PROVIDER NOTES
Encounter Date: 7/22/2025    SCRIBE #1 NOTE: I, Libby Christianson, am scribing for, and in the presence of,  Jame Alas DO.       History     Chief Complaint   Patient presents with    Shortness of Breath     Patient presents to the ED with c/o shortness of breath for a few weeks. Patient states he has been seen for this but  states he just stays winded.      This 64 y.o. male pt presents to the ED with c/o SOB. The pt reports coming to the ED here a few days ago with same SOB complaint. Pt said he when to [facility] and was dx with CHF and was given some medications to take the excessive fluid off of his body. Pt states he use to have lower leg swelling but he has been taking his medications for the fluid removal and his legs have gone down. Pt has scheduled appoint on the 24 to see physician about his SOB.     The history is provided by the patient. No  was used.     Review of patient's allergies indicates:   Allergen Reactions    Aloe vera latex gloves [gloves, latex with aloe vera] Rash    Latex Hives and Rash     Past Medical History:   Diagnosis Date    Arthritis     Cancer     AdenoCarcinoma RLL : XRT  ~ [id number] Felix    CHF (congestive heart failure)     Chronic pain     COPD (chronic obstructive pulmonary disease)     Coronary artery disease     Diabetes mellitus     Heart attack     Hypertension     Mixed hyperlipidemia      Past Surgical History:   Procedure Laterality Date    CARDIAC SURGERY      EYE SURGERY      hand fracture repair      NECK SURGERY N/A     ~ 2016 [place]    STOMACH SURGERY       Family History   Problem Relation Name Age of Onset    Heart disease Mother      Lung cancer Father      Heart disease Father       Social History[1]  Review of Systems   Constitutional:  Negative for activity change, chills, diaphoresis, fatigue and fever.   HENT:  Negative for congestion, drooling, ear pain, rhinorrhea, sneezing, sore throat and trouble swallowing.    Eyes:   Negative for pain.   Respiratory:  Positive for shortness of breath. Negative for cough, chest tightness, wheezing and stridor.    Cardiovascular:  Positive for leg swelling. Negative for chest pain and palpitations.   Gastrointestinal:  Negative for abdominal distention, abdominal pain, constipation, diarrhea, nausea and vomiting.   Genitourinary:  Negative for difficulty urinating, dysuria, frequency and urgency.   Musculoskeletal:  Negative for arthralgias, back pain, myalgias, neck pain and neck stiffness.   Skin:  Negative for pallor, rash and wound.   Neurological:  Negative for dizziness, syncope, weakness, light-headedness, numbness and headaches.   All other systems reviewed and are negative.      Physical Exam     Initial Vitals [07/22/25 1312]   BP Pulse Resp Temp SpO2   132/80 85 20 98 °F (36.7 °C) 96 %      MAP       --         Physical Exam    Nursing note and vitals reviewed.  Constitutional: He appears well-developed and well-nourished.   HENT:   Head: Normocephalic and atraumatic.   Right Ear: External ear normal.   Left Ear: External ear normal.   Nose: Nose normal. Mouth/Throat: Oropharynx is clear and moist.   Eyes: Conjunctivae and EOM are normal. Pupils are equal, round, and reactive to light.   Neck: Neck supple.   Normal range of motion.  Cardiovascular:  Normal rate, regular rhythm, normal heart sounds and intact distal pulses.           Pulmonary/Chest: Breath sounds normal.   Abdominal: Abdomen is soft. Bowel sounds are normal.   Genitourinary:    Prostate and penis normal.     Musculoskeletal:         General: Normal range of motion.      Cervical back: Normal range of motion and neck supple.     Neurological: He is alert and oriented to person, place, and time. He has normal strength and normal reflexes.   Skin: Skin is warm and dry.   Psychiatric: He has a normal mood and affect. His behavior is normal. Judgment and thought content normal.         ED Course   Procedures  Labs Reviewed    COMPREHENSIVE METABOLIC PANEL - Abnormal       Result Value    Sodium 139      Potassium 3.9      Chloride 102      CO2 27      Anion Gap 14      Glucose 157 (*)     BUN 21      Creatinine 1.64 (*)     BUN/Creatinine Ratio 13      Calcium 9.6      Total Protein 7.1      Albumin 3.5      Globulin 3.6      A/G Ratio 1.0      Bilirubin, Total 0.9      Alk Phos 73      ALT 12      AST 16      eGFR 47 (*)    NT-PRO NATRIURETIC PEPTIDE - Abnormal    ProBNP 913 (*)    CBC WITH DIFFERENTIAL - Abnormal    WBC 12.78 (*)     RBC 5.39      Hemoglobin 15.1      Hematocrit 47.0      MCV 87.2      MCH 28.0      MCHC 32.1      RDW 13.4      Platelet Count 306      MPV 11.5      Neutrophils % 85.0 (*)     Lymphocytes % 10.5 (*)     Monocytes % 3.8      Eosinophils % 0.1 (*)     Basophils % 0.1      Immature Granulocytes % 0.5 (*)     nRBC, Auto 0.0      Neutrophils, Abs 10.87 (*)     Lymphocytes, Absolute 1.34      Monocytes, Absolute 0.49      Eosinophils, Absolute 0.01      Basophils, Absolute 0.01      Immature Granulocytes, Absolute 0.06 (*)     nRBC, Absolute 0.00      Diff Type Auto     TROPONIN I - Normal    Troponin I High Sensitivity 8.5     TROPONIN I - Normal    Troponin I High Sensitivity 4.8     CBC W/ AUTO DIFFERENTIAL    Narrative:     The following orders were created for panel order CBC auto differential.  Procedure                               Abnormality         Status                     ---------                               -----------         ------                     CBC with Differential[[id number]]       Abnormal            Final result                 Please view results for these tests on the individual orders.     EKG Readings: (Independently Interpreted)   Heart Rate: 84 bpm.   Normal sinus rhythm  Normal ECG     ECG Results              EKG 12-lead (Final result)        Collection Time Result Time QRS Duration OHS QTC Calculation    07/22/25 13:11:43 07/23/25 08:50:49 92 432                      Final result by Interface, Lab In [facility] (07/23/25 08:50:54)                   Narrative:    Test Reason : R06.02,    Vent. Rate :  84 BPM     Atrial Rate :  84 BPM     P-R Int : 132 ms          QRS Dur :  92 ms      QT Int : 366 ms       P-R-T Axes :  76  79  60 degrees    QTcB Int : 432 ms    Normal sinus rhythm  Normal ECG  No previous ECGs available  Confirmed by Miguel Garner ([id number]) on 7/23/2025 8:50:45 AM    Referred By: AAAREFERRAL SELF           Confirmed By: Miguel Garner                                  Imaging Results              X-Ray Chest AP (Final result)  Result time 07/22/25 14:48:26      Final result by Yonatan Garcia MD (07/22/25 14:48:26)                   Impression:      Please see discussion above.      Electronically signed by: Yonatan Garcia  Date:    07/22/2025  Time:    14:48               Narrative:    EXAMINATION:  XR CHEST AP PORTABLE    CLINICAL HISTORY:  sob;    TECHNIQUE:  Single frontal view of the chest was performed.    COMPARISON:  Chest radiograph performed 07/05/2025, 20:12 hours.    FINDINGS:  Status post median sternotomy.    Grossly unchanged cardiac contours.  Chronic appearing interstitial coarsening appears relatively similar to prior examination.    Ill-defined soft tissue density measuring on the order of 39 mm projects in the right mid to lower lung zone adjoining the infrahilar region.  Abnormality has been present in this region on prior examinations, may correspond to findings in the right upper lobe on prior CT imaging of 01/15/2023.  Small chronic scar suggested, consider CT imaging follow-up of for more remote prior level precise characterization, given that malignancy not be excluded.    No acute detrimental change in appearance of the left hemithorax.  No pneumothorax or large volume pleural effusion.    No acute findings in the visualized abdomen.    Osseous and soft tissue structures appear without definite acute change.                                     X-Rays:   Independently Interpreted Readings:   Other Readings:  EXAMINATION:  XR CHEST AP PORTABLE     CLINICAL HISTORY:  sob;     TECHNIQUE:  Single frontal view of the chest was performed.     COMPARISON:  Chest radiograph performed 07/05/2025, 20:12 hours.     FINDINGS:  Status post median sternotomy.     Grossly unchanged cardiac contours.  Chronic appearing interstitial coarsening appears relatively similar to prior examination.     Ill-defined soft tissue density measuring on the order of 39 mm projects in the right mid to lower lung zone adjoining the infrahilar region.  Abnormality has been present in this region on prior examinations, may correspond to findings in the right upper lobe on prior CT imaging of 01/15/2023.  Small chronic scar suggested, consider CT imaging follow-up of for more remote prior level precise characterization, given that malignancy not be excluded.     No acute detrimental change in appearance of the left hemithorax.  No pneumothorax or large volume pleural effusion.     No acute findings in the visualized abdomen.     Osseous and soft tissue structures appear without definite acute change.     Impression:     Please see discussion above.        Electronically signed by:Yonatan Garcia  Date:                                            07/22/2025  Time:                                           14:48      Exam Ended: 07/22/25 13:52 CDT Last Resulted: 07/22/25 14:48 CDT           Medications - No data to display  Medical Decision Making  Amount and/or Complexity of Data Reviewed  Labs: ordered. Decision-making details documented in ED Course.  Radiology: ordered.              Attending Attestation:           Physician Attestation for Scribe:  Physician Attestation Statement for Scribe #1: I, Jame Alas, DO, reviewed documentation, as scribed by Libby Christianson in my presence, and it is both accurate and complete.             ED Course as of 07/23/25 1232   Tue Jul 22,  2025   1618 07/22/25 1450  X-Ray Chest AP  Performed: 07/22/25 1352  Final         Impression:  Please see discussion above.      Electronically signed by: Yonatan Boswellnara  Date: 07/22/2025  Time: 14:48       [CM]   1626 CBC auto differential(!) [WB]   1626 WBC(!): 12.78 [WB]      ED Course User Index  [CM] Sammy Libby  [WB] Jame Alas,                 Medical Decision Making:   Initial Assessment:   This 64 y.o. male pt presents to the ED with c/o SOB. The pt reports coming to the ED here a few days ago with same SOB complaint. Pt said he when to [facility] and was dx with CHF and was given some medications to take the excessive fluid off of his body. Pt states he use to have lower leg swelling but he has been taking his medications for the fluid removal and his legs have gone down. Pt has scheduled appoint on the 24 to see physician about his SOB.     The history is provided by the patient. No  was used.     Differential Diagnosis:   Shortness for breath--history of taking Lasix for congestive failure.  We will be fallen up with Ricki Moy in the cardiac clinic in [facility]  ED Management:  Looking at the x-ray that was done today there was a nodule that was seen that could be malignant.  Patient has a history of adenocarcinoma of the right lower lobe.  He just had a CT scan done 2 days ago.  And is scheduled to see Ricki Moy in 2 days.  No large infiltrates, the BNP is 900.  Follow-up when Ricki Moy in clinic in 2 days continue current medication                Clinical Impression:  Final diagnoses:  [R06.02] SOB (shortness of breath)          ED Disposition Condition    Discharge Stable          ED Prescriptions    None       Follow-up Information    None                [1]   Social History  Tobacco Use    Smoking status: Every Day     Current packs/day: 0.50     Average packs/day: 1 pack/day for 41.9 years (40.9 ttl pk-yrs)     Types: Cigarettes     Start date: 8/29/1983     Smokeless tobacco: Never    Tobacco comments:     Trying to cut back    Substance Use Topics    Alcohol use: Never    Drug use: Never        Jame Alas,   07/23/25 [id number]

## 2025-07-23 LAB
OHS QRS DURATION: 92 MS
OHS QTC CALCULATION: 432 MS

## 2025-07-24 ENCOUNTER — TELEPHONE (OUTPATIENT)
Dept: EMERGENCY MEDICINE | Facility: HOSPITAL | Age: 65
End: 2025-07-24
Payer: OTHER GOVERNMENT

## 2025-08-24 ENCOUNTER — HOSPITAL ENCOUNTER (EMERGENCY)
Facility: HOSPITAL | Age: 65
Discharge: HOME | End: 2025-08-24
Payer: OTHER GOVERNMENT

## 2025-08-24 VITALS
OXYGEN SATURATION: 95 % | TEMPERATURE: 98 F | WEIGHT: 180 LBS | HEART RATE: 83 BPM | DIASTOLIC BLOOD PRESSURE: 75 MMHG | BODY MASS INDEX: 28.25 KG/M2 | SYSTOLIC BLOOD PRESSURE: 120 MMHG | HEIGHT: 67 IN | RESPIRATION RATE: 20 BRPM

## 2025-08-24 DIAGNOSIS — M54.41 CHRONIC MIDLINE LOW BACK PAIN WITH BILATERAL SCIATICA: Primary | ICD-10-CM

## 2025-08-24 DIAGNOSIS — G89.29 CHRONIC MIDLINE LOW BACK PAIN WITH BILATERAL SCIATICA: Primary | ICD-10-CM

## 2025-08-24 DIAGNOSIS — R06.02 SOB (SHORTNESS OF BREATH): ICD-10-CM

## 2025-08-24 DIAGNOSIS — M54.42 CHRONIC MIDLINE LOW BACK PAIN WITH BILATERAL SCIATICA: Primary | ICD-10-CM

## 2025-08-24 PROCEDURE — 99284 EMERGENCY DEPT VISIT MOD MDM: CPT | Mod: 25

## 2025-08-24 PROCEDURE — 63600175 PHARM REV CODE 636 W HCPCS: Mod: JZ,TB | Performed by: NURSE PRACTITIONER

## 2025-08-24 PROCEDURE — 96372 THER/PROPH/DIAG INJ SC/IM: CPT | Performed by: NURSE PRACTITIONER

## 2025-08-24 PROCEDURE — 93005 ELECTROCARDIOGRAM TRACING: CPT

## 2025-08-24 RX ORDER — KETOROLAC TROMETHAMINE 30 MG/ML
30 INJECTION, SOLUTION INTRAMUSCULAR; INTRAVENOUS
Status: COMPLETED | OUTPATIENT
Start: 2025-08-24 | End: 2025-08-24

## 2025-08-24 RX ADMIN — KETOROLAC TROMETHAMINE 30 MG: 30 INJECTION, SOLUTION INTRAMUSCULAR; INTRAVENOUS at 02:08

## 2025-09-04 ENCOUNTER — OFFICE VISIT (OUTPATIENT)
Dept: PULMONOLOGY | Facility: CLINIC | Age: 65
End: 2025-09-04
Payer: OTHER GOVERNMENT

## 2025-09-04 ENCOUNTER — TELEPHONE (OUTPATIENT)
Dept: PULMONOLOGY | Facility: CLINIC | Age: 65
End: 2025-09-04
Payer: OTHER GOVERNMENT

## 2025-09-04 VITALS
HEIGHT: 67 IN | RESPIRATION RATE: 18 BRPM | SYSTOLIC BLOOD PRESSURE: 120 MMHG | WEIGHT: 187 LBS | DIASTOLIC BLOOD PRESSURE: 62 MMHG | OXYGEN SATURATION: 94 % | BODY MASS INDEX: 29.35 KG/M2 | HEART RATE: 83 BPM

## 2025-09-04 DIAGNOSIS — J44.9 CHRONIC OBSTRUCTIVE PULMONARY DISEASE, UNSPECIFIED COPD TYPE: Primary | ICD-10-CM

## 2025-09-04 DIAGNOSIS — F17.200 NEEDS SMOKING CESSATION EDUCATION: ICD-10-CM

## 2025-09-04 PROCEDURE — 99214 OFFICE O/P EST MOD 30 MIN: CPT | Mod: S$PBB,,, | Performed by: INTERNAL MEDICINE

## 2025-09-04 PROCEDURE — 99999 PR PBB SHADOW E&M-EST. PATIENT-LVL V: CPT | Mod: PBBFAC,,, | Performed by: INTERNAL MEDICINE

## 2025-09-04 PROCEDURE — 99215 OFFICE O/P EST HI 40 MIN: CPT | Mod: PBBFAC | Performed by: INTERNAL MEDICINE

## 2025-09-04 RX ORDER — MAGNESIUM OXIDE 420 MG/1
420 TABLET ORAL
COMMUNITY
Start: 2025-07-09

## 2025-09-04 RX ORDER — METOPROLOL TARTRATE 50 MG/1
50 TABLET ORAL
COMMUNITY
Start: 2025-07-09

## 2025-09-04 RX ORDER — TIOTROPIUM BROMIDE INHALATION SPRAY 3.12 UG/1
5 SPRAY, METERED RESPIRATORY (INHALATION) DAILY
Qty: 4 G | Refills: 5 | Status: SHIPPED | OUTPATIENT
Start: 2025-09-04

## 2025-09-04 RX ORDER — CHOLECALCIFEROL (VITAMIN D3) 25 MCG
25 TABLET ORAL
COMMUNITY
Start: 2025-07-09

## 2025-09-04 RX ORDER — GABAPENTIN 600 MG/1
600 TABLET ORAL 3 TIMES DAILY
COMMUNITY
Start: 2025-07-09

## 2025-09-04 RX ORDER — FUROSEMIDE 20 MG/1
20 TABLET ORAL
COMMUNITY
Start: 2025-07-16

## 2025-09-04 RX ORDER — ISOSORBIDE MONONITRATE 30 MG/1
15 TABLET, EXTENDED RELEASE ORAL
COMMUNITY
Start: 2025-08-06